# Patient Record
Sex: MALE | Race: BLACK OR AFRICAN AMERICAN | NOT HISPANIC OR LATINO | Employment: UNEMPLOYED | URBAN - METROPOLITAN AREA
[De-identification: names, ages, dates, MRNs, and addresses within clinical notes are randomized per-mention and may not be internally consistent; named-entity substitution may affect disease eponyms.]

---

## 2017-06-01 ENCOUNTER — TRANSFERRED RECORDS (OUTPATIENT)
Dept: HEALTH INFORMATION MANAGEMENT | Facility: CLINIC | Age: 32
End: 2017-06-01

## 2017-06-02 ENCOUNTER — MEDICAL CORRESPONDENCE (OUTPATIENT)
Dept: HEALTH INFORMATION MANAGEMENT | Facility: CLINIC | Age: 32
End: 2017-06-02

## 2017-08-03 ENCOUNTER — OFFICE VISIT (OUTPATIENT)
Dept: OPHTHALMOLOGY | Facility: CLINIC | Age: 32
End: 2017-08-03
Attending: OPHTHALMOLOGY
Payer: MEDICAID

## 2017-08-03 DIAGNOSIS — H40.1190 POAG (PRIMARY OPEN-ANGLE GLAUCOMA): Primary | ICD-10-CM

## 2017-08-03 DIAGNOSIS — H54.7 POOR VISION: ICD-10-CM

## 2017-08-03 DIAGNOSIS — H20.041 SECONDARY IRIDOCYCLITIS, NONINFECTIOUS, RIGHT: ICD-10-CM

## 2017-08-03 PROCEDURE — 99213 OFFICE O/P EST LOW 20 MIN: CPT | Mod: ZF

## 2017-08-03 PROCEDURE — 76512 OPH US DX B-SCAN: CPT | Mod: ZF | Performed by: OPHTHALMOLOGY

## 2017-08-03 RX ORDER — PREDNISOLONE ACETATE 1 %
1 SUSPENSION, DROPS(FINAL DOSAGE FORM)(ML) OPHTHALMIC (EYE) 4 TIMES DAILY
Qty: 15 ML | Refills: 3 | Status: SHIPPED | OUTPATIENT
Start: 2017-08-03 | End: 2017-08-03

## 2017-08-03 RX ORDER — ATROPINE SULFATE 10 MG/ML
1 SOLUTION/ DROPS OPHTHALMIC 2 TIMES DAILY
Qty: 1 BOTTLE | Refills: 11 | Status: SHIPPED | OUTPATIENT
Start: 2017-08-03 | End: 2022-11-01

## 2017-08-03 RX ORDER — TIZANIDINE HYDROCHLORIDE 4 MG/1
CAPSULE, GELATIN COATED ORAL 3 TIMES DAILY
COMMUNITY

## 2017-08-03 RX ORDER — PREDNISOLONE ACETATE 10 MG/ML
1 SUSPENSION/ DROPS OPHTHALMIC 4 TIMES DAILY
Qty: 15 BOTTLE | Refills: 3 | Status: SHIPPED | OUTPATIENT
Start: 2017-08-03 | End: 2022-11-01

## 2017-08-03 ASSESSMENT — EXTERNAL EXAM - LEFT EYE: OS_EXAM: NORMAL

## 2017-08-03 ASSESSMENT — TONOMETRY
IOP_METHOD: TONOPEN
OD_IOP_MMHG: 34
IOP_METHOD: ICARE
OS_IOP_MMHG: 24
OD_IOP_MMHG: 44

## 2017-08-03 ASSESSMENT — VISUAL ACUITY
METHOD: SNELLEN - LINEAR
OS_SC: FIXES AND FOLLOWS

## 2017-08-03 ASSESSMENT — SLIT LAMP EXAM - LIDS
COMMENTS: NORMAL
COMMENTS: NORMAL

## 2017-08-03 ASSESSMENT — EXTERNAL EXAM - RIGHT EYE: OD_EXAM: NORMAL

## 2017-08-03 ASSESSMENT — CONF VISUAL FIELD: COMMENTS: UNABLE

## 2017-08-03 NOTE — PROGRESS NOTES
CC: 32y/o M referred by Dr Praveen Beaver for eval of elevated intraocular pressure     HPI: Eval of acute angle closure glaucoma - right eye. Pt was seen at [facility] on 6/1/17 for eval of redness and cloudiness in right eye. Mom had noticed that he was hitting himself in the right eye and was concerned this was a pain behavior. IOP was elevated to high 50s at that time.    Cataract removal - right side with Dr. Beaver on 6/2/17. Mom reports he has been hitting the right side less frequently. Does continue to notice mild redness in right eye on some days. Not nearly as red as prior to visit to [facility] in 6/2017. Is finished with post-op drops and is not currently using any eyedrops.     Mom has tried safety goggles but pt does not tolerate these.    PAST OCULAR HISTORY:  Hx traumatic cataract s/p removal 6/2/17 (Dr. SOLITARIO Beaver)    MAXIMUM INTRAOCULAR PRESSURE:    64/unsure    MEDICATIONS:  No drops  Keppra  Clonidine  Lorazepam  Vimpat  Baclofen  Glycopyrrolate  Tizanidine  PEG    PMH:  Cerebral palsy  Seizure disorder    FAMILY/SOCIAL HISTORY:        Maternal grandmother: glaucoma, no drops or surgery, has lost significant vision    TESTING TODAY:  Unable to do visual field or OCT  B Scan ultrasound right eye shows possibly hemorrhagic choroidals vs. tractional heme PVD vs. RD. Limited patient cooperation compromises quality.    EXAM:  Mild injection with temporally subluxed IOL, right eye. Difficult exam due to pt cooperation  No red reflex right eye    IMPRESSION:  Acute angle closure glaucoma, right eye:   Hx traumatic hypermature cataract s/p CE/IOL right eye 6/2/17 with Dr. Beaver   Pain behaviors improved since surgery per mom, but pt does continue to hit the right eye   Does not tolerate safety goggles per mom   IOP OD elevated today to 34 iCare/44 tonopen (pt squeezing so probably not that high)   Difficult exam due to pt cooperation. IOL OD appears slightly subluxed  temporally    It is possible that patient originally had phacomorphic glaucoma with eye pain causing him to hit his eye versus some other cause of high intraocular pressure with cataract the result of repeated self inflicted trauma.    Would not be a good trabeculectomy or tube candidate.  If intraocular pressure not controlled after retinal surgery may need laser cyclodestruction or goniosynechialysis     PLAN:  Discussed with Dr Bardales and B Scan right eye reviewed  Patient will see Dr Bardales next week and she will discuss with his mother the risks and benefits of surgery  Begin prednisolone four times a day and atropine twice a day right eye until retina appointment  May need glaucoma medications also,  Intraocular pressure feels about 30 right eye     Attending Physician Attestation:  Complete documentation of historical and exam elements from today's encounter can be found in the full encounter summary report (not reduplicated in this progress note). I personally obtained the chief complaint(s) and history of present illness. I confirmed and edited asnecessary the review of systems, past medical/surgical history, family history, social history, and examination findings as documented by others; and I examined the patient myself. I personally reviewed the relevant tests, images, and reports as documented above. I formulated and edited as necessary the assessment and plan and discussed the findings and management plan with the patient and family.  - Anna Joyner MD 12:16 PM 8/3/2017

## 2017-08-03 NOTE — MR AVS SNAPSHOT
After Visit Summary   8/3/2017    Jerry Ingram    MRN: [id number]           Patient Information     Date Of Birth          1985        Visit Information        Provider Department      8/3/2017 8:30 AM Anna Joyner MD [facility]        Today's Diagnoses     POAG (primary open-angle glaucoma)    -  1    Poor vision        Secondary iridocyclitis, noninfectious, right           Follow-ups after your visit        Follow-up notes from your care team     Return in about 5 years (around 8/3/2022).      Your next 10 appointments already scheduled     Aug 10, 2017  8:30 AM CDT   NEW RETINA with Laney Bardales MD   Eye Clinic ([facility])    [address]  [address]  [address]  [facility] [address] [phone]              Who to contact     Please call your clinic at [phone] to:    Ask questions about your health    Make or cancel appointments    Discuss your medicines    Learn about your test results    Speak to your doctor   If you have compliments or concerns about an experience at your clinic, or if you wish to file a complaint, please contact [facility] Patient Relations at [phone] or email us at [email or web address]         Additional Information About Your Visit        MyChart Information     [employer] is an electronic gateway that provides easy, online access to your medical records. With [employer], you can request a clinic appointment, read your test results, renew a prescription or communicate with your care team.     To sign up for [employer] visit the website at [email or web address]   You will be asked to enter the access code listed below, as well as some personal information. Please follow the directions to create your username and password.     Your access code is: [email or web address]  Expires: 10/18/2017  6:30 AM     Your access code will  in 90 days. If you need help or a new code, please  contact your [facility] or call [phone] for assistance.        Care EveryWhere ID     This is your Care EveryWhere ID. This could be used by other organizations to access your [place] medical records  [id number]         Blood Pressure from Last 3 Encounters:   08/26/11 112/72   03/01/11 121/85    Weight from Last 3 Encounters:   No data found for Wt              We Performed the Following     Pachymetry OU (both eyes)     Ultrasound B-scan OD (right eye)          Today's Medication Changes          These changes are accurate as of: 8/3/17 11:36 AM.  If you have any questions, ask your nurse or doctor.               Start taking these medicines.        Dose/Directions    atropine 1 % ophthalmic solution   Used for:  Secondary iridocyclitis, noninfectious, right   Started by:  Anna Joyner MD        Dose:  1 drop   Place 1 drop into the right eye 2 times daily   Quantity:  1 Bottle   Refills:  11       prednisoLONE acetate 1 % ophthalmic susp   Commonly known as:  PRED FORTE   Used for:  Secondary iridocyclitis, noninfectious, right   Started by:  Anna Joyner MD        Dose:  1 drop   Place 1 drop into the right eye 4 times daily   Quantity:  15 Bottle   Refills:  3         These medicines have changed or have updated prescriptions.        Dose/Directions    tiZANidine 4 MG capsule   Commonly known as:  ZANAFLEX   This may have changed:  Another medication with the same name was removed. Continue taking this medication, and follow the directions you see here.   Changed by:  Anna Joyner MD        Take by mouth 3 times daily   Refills:  0         Stop taking these medicines if you haven't already. Please contact your care team if you have questions.     lamoTRIgine 25 MG tablet   Commonly known as:  LaMICtal   Stopped by:  Anna Joyner MD           ROBINUL 1 MG tablet   Generic drug:  glycopyrrolate   Stopped by:  Anna Joyner MD                Where to  get your medicines      These medications were sent to [id number] - [facility] [address]  [address] [facility] [address]     Phone:  [phone]     atropine 1 % ophthalmic solution    prednisoLONE acetate 1 % ophthalmic susp                Primary Care Provider    No Ref-Primary Verified       No address on file        Equal Access to Services     [facility]: Hadii aad ku hadasho Soomaali, waaxda luqadaha, qaybta kaalmada adeegyada, waxay idiin hayaan adeeg khvirginiash laabram . So [facility] [phone].    ATENCIÓN: Si habla español, tiene a garrido disposición servicios gratuitos de asistencia lingüística. Mason al [phone].    We comply with applicable federal civil rights laws and [place] laws. We do not discriminate on the basis of race, color, national origin, age, disability sex, sexual orientation or gender identity.            Thank you!     Thank you for choosing EYE CLINIC  for your care. Our goal is always to provide you with excellent care. Hearing back from our patients is one way we can continue to improve our services. Please take a few minutes to complete the written survey that you may receive in the mail after your visit with us. Thank you!             Your Updated Medication List - Protect others around you: Learn how to safely use, store and throw away your medicines at www.disposemymeds.org.          This list is accurate as of: 8/3/17 11:36 AM.  Always use your most recent med list.                   Brand Name Dispense Instructions for use Diagnosis    atropine 1 % ophthalmic solution     1 Bottle    Place 1 drop into the right eye 2 times daily    Secondary iridocyclitis, noninfectious, right       BACLOFEN PO      Take by mouth 4 times daily        CLONIDINE HCL PO      Take by mouth 2 times daily        KEPPRA 100 MG/ML solution   Generic drug:  levETIRAcetam      15ML TWICE DAILY        LORazepam 0.5 MG tablet    ATIVAN      Take by mouth as needed        prednisoLONE acetate 1 % ophthalmic susp    PRED FORTE    15 Bottle    Place 1 drop into the right eye 4 times daily    Secondary iridocyclitis, noninfectious, right       predniSONE 20 MG tablet    DELTASONE    3 tablet    Take 1 tablet by mouth daily.        tiZANidine 4 MG capsule    ZANAFLEX     Take by mouth 3 times daily        VIMPAT PO      Take by mouth 2 times daily

## 2017-08-03 NOTE — NURSING NOTE
Chief Complaints and History of Present Illnesses   Patient presents with     Glaucoma Evaluation     HPI    Informant(s):  Mom   Affected eye(s):  Both   Symptoms:     Blurred vision   Redness   Eye discharge         Do you have eye pain now?:  No      Comments:  Had CEIOL RE. Not on any drops. Last eye check was end of June. Per mom he has a history of hitting his RE which caused his glaucoma.     Vero RICARDO August 3, 2017 8:51 AM

## 2017-08-03 NOTE — LETTER
August 3, 2017      Praveen Beaver MD  [facility]  [address] [place] [address]  Fax: [phone]      RE: JERRY INGRAM  : 1985      Dear Dr. Beaver:    I had the pleasure of seeing Jerry Ingram on August 3, 2017 at the [facility].  My exam findings are as follows:      Visual Acuity (Snellen - Linear)      Right Left   Dist sc blinks to light, does not fix or follow fixes and follows            Tonometry (ICare, 8:57 AM)      Right Left   Pressure 34 24         Tonometry #2 (Tonopen, 9:54 AM)      Right Left   Pressure 44          Tonometry Comments     Squeezing          Main Ophthalmology Exam     External Exam      Right Left    External Normal Normal      Slit Lamp Exam      Right Left    Lids/Lashes Normal Normal    Conjunctiva/Sclera 1+ diffuse injection White and quiet    Cornea Clear Clear    Anterior Chamber Deep and quiet Deep and quiet    Iris Round and reactive Round and reactive    Lens IOL subluxed slightly temporally Clear      Fundus Exam      Right Left    Disc no red reflex clear but fleeting view of fundus             History and Present Illness:  32y/o M referred by Dr. Praveen Beaver for eval of elevated intraocular pressure     Eval of acute angle closure glaucoma - right eye. Pt was seen at [facility] on 17 for eval of redness and cloudiness in right eye. Mom had noticed that he was hitting himself in the right eye and was concerned this was a pain behavior. IOP was elevated to high 50s at that time.    Cataract removal - right side with Dr. Beaver on 17. Mom reports he has been hitting the right side less frequently. Does continue to notice mild redness in right eye on some days. Not nearly as red as prior to visit to [facility] in 2017. Is finished with post-op drops and is not currently using any eyedrops.     Mom has tried safety goggles but pt does not tolerate these.    Past Ocular History:   Hx  traumatic cataract s/p removal 6/2/17 (Dr. SOLITARIO Beaver)    Maximum Intraocular Pressure: 64/unsure    Medications:  No drops  Keppra  Clonidine  Lorazepam  Vimpat  Baclofen  Glycopyrrolate  Tizanidine  PEG    Past Medical History:  Cerebral palsy  Seizure disorder    Family/Social History:     Maternal grandmother: glaucoma, no drops or surgery, has lost significant vision      Testing Today:  Unable to do visual field or OCT  B Scan ultrasound right eye shows possibly hemorrhagic choroidals vs. tractional heme PVD vs. RD. Limited patient cooperation compromises quality.    Exam:  Mild injection with temporally subluxed IOL, right eye. Difficult exam due to pt cooperation  No red reflex right eye    Impression:  Acute angle closure glaucoma, right eye:   Hx traumatic hypermature cataract s/p CE/IOL right eye 6/2/17 with Dr. Beaver   Pain behaviors improved since surgery per mom, but pt does continue to hit the right eye   Does not tolerate safety goggles per mom   IOP OD elevated today to 34 iCare/44 tonopen (pt squeezing so probably not that high)   Difficult exam due to pt cooperation. IOL OD appears slightly subluxed temporally    It is possible that patient originally had phacomorphic glaucoma with eye pain causing him to hit his eye versus some other cause of high intraocular pressure with cataract the result of repeated self inflicted trauma.    Would not be a good trabeculectomy or tube candidate.  If intraocular pressure not controlled after retinal surgery may need laser cyclodestruction or goniosynechialysis.     Plan:  Discussed with Dr. Bardales and B Scan right eye reviewed  Patient will see Dr. Bardales next week and she will discuss with his mother the risks and benefits of surgery  Begin prednisolone four times a day and atropine twice a day right eye until retina appointment  May need glaucoma medications also,  Intraocular pressure feels about 30 right eye       Thank you for allowing me to  participate in his care.       Sincerely,       Anna Joyner MD  Glaucoma   [facility]    Enclosed: B scan

## 2017-08-10 ENCOUNTER — TELEPHONE (OUTPATIENT)
Dept: OPHTHALMOLOGY | Facility: CLINIC | Age: 32
End: 2017-08-10

## 2017-08-10 ENCOUNTER — OFFICE VISIT (OUTPATIENT)
Dept: OPHTHALMOLOGY | Facility: CLINIC | Age: 32
End: 2017-08-10
Attending: OPHTHALMOLOGY
Payer: MEDICAID

## 2017-08-10 DIAGNOSIS — H33.21 RETINAL DETACHMENT, RIGHT: Primary | ICD-10-CM

## 2017-08-10 DIAGNOSIS — T85.22XA: ICD-10-CM

## 2017-08-10 PROCEDURE — 76512 OPH US DX B-SCAN: CPT | Mod: ZF | Performed by: OPHTHALMOLOGY

## 2017-08-10 PROCEDURE — 76513 OPH US DX ANT SGM US UNI/BI: CPT | Mod: ZF | Performed by: OPHTHALMOLOGY

## 2017-08-10 PROCEDURE — 99212 OFFICE O/P EST SF 10 MIN: CPT | Mod: ZF

## 2017-08-10 ASSESSMENT — TONOMETRY
OD_IOP_MMHG: 24
IOP_METHOD: ICARE
OS_IOP_MMHG: 14

## 2017-08-10 ASSESSMENT — EXTERNAL EXAM - LEFT EYE: OS_EXAM: NORMAL

## 2017-08-10 ASSESSMENT — VISUAL ACUITY: METHOD: SNELLEN - LINEAR

## 2017-08-10 ASSESSMENT — SLIT LAMP EXAM - LIDS
COMMENTS: NORMAL
COMMENTS: NORMAL

## 2017-08-10 ASSESSMENT — EXTERNAL EXAM - RIGHT EYE: OD_EXAM: NORMAL

## 2017-08-10 NOTE — PROGRESS NOTES
CC: referred by Dr. Joyner for Retinal Detachment evaluation   HPI: Jerry Ingram is a  31 year old year-old patient with history of acute angle closure glaucoma recently evaluated by . Note to have hemorrhagic choroidals vs vitreous hemorrhage vs Retinal Detachment on b-scan and referred for evaluation.    Pt was seen at [facility] on 6/1/17 for eval of redness and cloudiness in right eye. Mom had noticed that he was hitting himself in the right eye and was concerned this was a pain behavior. IOP was elevated to high 50s at that time.  Cataract removal - right side with Dr. Beaver on 6/2/17. Mom reports he has been hitting the right side less frequently. Does continue to notice mild redness in right eye on some days. Not nearly as red as prior to visit to [facility] in 6/2017.  Mom has tried safety goggles but pt does not tolerate these.    PAST OCULAR HISTORY: Hx traumatic cataract s/p removal 6/2/17 (Dr. SOLITARIO Beaver)    Retinal Imaging:  b-scan Right Eye  8/10/17  Hyperechoic density superiorly suggestive of heme vs choroidals. possible Retinal Detachment, there is a hyperechoic band extending post to ant.  UBM showing intraocular lens behind the iris (1.5 -2 mm posterior to the iris); shallow AC    Assessment & Plan:   1- possible retinal detachment Right Eye; possible choroidals; possible IOL dislocation   - history of recent cataract surgery and subsequent lens subluxation  - no view to fundus   Due to VH and corneal edema  - DD includes vitreous hemorrhage vs Retinal Detachment   - need to consider body harness for after surgery to protect from self trauma, mom will discuss with PCP    2. Hx traumatic hypermature cataract s/p CE/IOL right eye 6/2/17 with Dr. Beaver  Pain behaviors improved since surgery per mom, but pt does continue to hit the right eye  Does not tolerate safety goggles per mom  IOP right eye 24 bu persistent K edema  Difficult exam due to pt cooperation. IOL OD  appears slightly subluxed temporally  It is possible that patient originally had phacomorphic glaucoma with eye pain causing him to hit his eye versus some other cause of high intraocular pressure with cataract the result of repeated self inflicted trauma.  Would not be a good trabeculectomy or tube candidate.  If intraocular pressure not controlled after retinal surgery may need laser cyclodestruction or goniosynechiolysis.     3. Patient with history of MR; CP; microcephaly  Patient  On a wheel chair; does not walk  on tube feedings.   Poor cooperation    Plan for surgery and exam under anesthesia both eyes   right eye:  23 g Pars plana vitrectomy (PPV) ; possible Retinal Detachment repair; possible gas vs SO; possible choroidal drainage; possible IOL repositioning; removal; exchange right eye  General anesthesia; 2-3 hours surgery   might need combo surgery for intraocular lens repositioning/ suturing or exchange  Possible  cyclodestruction or goniosynechiolysis.       Hernesto Dumont MD, PhD  Vitreoretinal Surgery Fellow    ~~~~~~~~~~~~~~~~~~~~~~~~~~~~~~~~~~   Complete documentation of historical and exam elements from today's encounter can be found in the full encounter summary report (not reduplicated in this progress note).  I personally obtained the chief complaint(s) and history of present illness.  I confirmed and edited as necessary the review of systems, past medical/surgical history, family history, social history, and examination findings as documented by others; and I examined the patient myself.  I personally reviewed the relevant tests, images, and reports as documented above.  I formulated and edited as necessary the assessment and plan and discussed the findings and management plan with the patient and family    Laney Bardales MD  .  Retina Service   Department of Ophthalmology and Visual Neurosciences   [facility]  Phone: [phone]   Fax: [phone]

## 2017-08-10 NOTE — Clinical Note
Evert Torres, Patient initially referred to Dr. Joyner by Dr. Beaver. This patient needs to be schedule for vitrectomy; likely combo surgery with cornea because of  Possible dislocation of the intraocular lens ( Dr. Joyner and I thought slightly dislocated intraocular lens). Patient left today before he follow up with cornea. Complex case.  Patient with history of MR; CP; microcephaly; Patient  On a wheel chair; does not walk on tube feedings. poor cooperation for clinical examinatinon.  Plan for ight eye:  23 g Pars plana vitrectomy (PPV) ; possible Retinal Detachment repair; possible gas vs SO; possible choroidal drainage; possible IOL repositioning; removal; exchange right eye General anesthesia; 2-3 hours surgery. Would favor Combo case with cornea. Anna; do you think he needs laser cyclodestruction or goniosynechiolysis? At the time of surgery? Today intraocular pressure 24 pls see my clinical note Thanks siena

## 2017-08-10 NOTE — LETTER
8/10/2017       RE: Jerry Ingram  [address]  [facility] [address]     Dear Colleague,    Thank you for referring your patient, Jerry Ingram, to the EYE CLINIC at [facility]. Please see a copy of my visit note below.      CC: referred by Dr. Joyner for Retinal Detachment evaluation   HPI: Jerry Ingram is a  31 year old year-old patient with history of acute angle closure glaucoma recently evaluated by . Note to have hemorrhagic choroidals vs vitreous hemorrhage vs Retinal Detachment on b-scan and referred for evaluation.    Pt was seen at [facility] on 6/1/17 for eval of redness and cloudiness in right eye. Mom had noticed that he was hitting himself in the right eye and was concerned this was a pain behavior. IOP was elevated to high 50s at that time.  Cataract removal - right side with Dr. Beaver on 6/2/17. Mom reports he has been hitting the right side less frequently. Does continue to notice mild redness in right eye on some days. Not nearly as red as prior to visit to [facility] in 6/2017.  Mom has tried safety goggles but pt does not tolerate these.    PAST OCULAR HISTORY: Hx traumatic cataract s/p removal 6/2/17 (Dr. SOLITARIO Beaver)    Retinal Imaging:  b-scan Right Eye  8/10/17  Hyperechoic density superiorly suggestive of heme vs choroidals. possible Retinal Detachment, there is a hyperechoic band extending post to ant.  UBM showing intraocular lens behind the iris (1.5 -2 mm posterior to the iris); shallow AC    Assessment & Plan:   1- possible retinal detachment Right Eye; possible choroidals; possible IOL dislocation   - history of recent cataract surgery and subsequent lens subluxation  - no view to fundus   Due to VH and corneal edema  - DD includes vitreous hemorrhage vs Retinal Detachment   - need to consider body harness for after surgery to protect from self trauma, mom will discuss with PCP    2. Hx traumatic hypermature cataract  s/p CE/IOL right eye 6/2/17 with Dr. Beaver  Pain behaviors improved since surgery per mom, but pt does continue to hit the right eye  Does not tolerate safety goggles per mom  IOP right eye 24 bu persistent K edema  Difficult exam due to pt cooperation. IOL OD appears slightly subluxed temporally  It is possible that patient originally had phacomorphic glaucoma with eye pain causing him to hit his eye versus some other cause of high intraocular pressure with cataract the result of repeated self inflicted trauma.  Would not be a good trabeculectomy or tube candidate.  If intraocular pressure not controlled after retinal surgery may need laser cyclodestruction or goniosynechiolysis.     3. Patient with history of MR; CP; microcephaly  Patient  On a wheel chair; does not walk  on tube feedings.   Poor cooperation    Plan for surgery and exam under anesthesia both eyes   right eye:  23 g Pars plana vitrectomy (PPV) ; possible Retinal Detachment repair; possible gas vs SO; possible choroidal drainage; possible IOL repositioning; removal; exchange right eye  General anesthesia; 2-3 hours surgery   might need combo surgery for intraocular lens repositioning/ suturing or exchange  Possible  cyclodestruction or goniosynechiolysis.       Again, thank you for allowing me to participate in the care of your patient.      Sincerely,      Laney Bardales MD  .  Retina Service   Department of Ophthalmology and Visual Neurosciences   [facility]  Phone: [phone]   Fax: [phone]

## 2017-08-10 NOTE — NURSING NOTE
Chief Complaints and History of Present Illnesses   Patient presents with     Consult For     Retina evaluation      HPI    Affected eye(s):  Both   Symptoms:        Duration:  1 week   Frequency:  Constant       Do you have eye pain now?:  No      Comments:  Pt.'s mother states that they are here to discuss possible retina surgery.  No changes BE per pt.'s mother.  Marie Stratton COT 8:37 AM August 10, 2017

## 2017-08-10 NOTE — MR AVS SNAPSHOT
After Visit Summary   8/10/2017    Jerry Ingram    MRN: [id number]           Patient Information     Date Of Birth          1985        Visit Information        Provider Department      8/10/2017 8:30 AM Laney Bardales [facility]        Today's Diagnoses     Retinal detachment, right - Right Eye    -  1    Dislocation of intraocular lens into vitreous of right eye           Follow-ups after your visit        Follow-up notes from your care team     Return in about 1 week (around 2017).      Who to contact     Please call your clinic at [phone] to:    Ask questions about your health    Make or cancel appointments    Discuss your medicines    Learn about your test results    Speak to your doctor   If you have compliments or concerns about an experience at your clinic, or if you wish to file a complaint, please contact [facility] Patient Relations at [phone] or email us at [email or web address]         Additional Information About Your Visit        MyChart Information     etriggt is an electronic gateway that provides easy, online access to your medical records. With [email or web address], you can request a clinic appointment, read your test results, renew a prescription or communicate with your care team.     To sign up for etriggt visit the website at [email or web address]   You will be asked to enter the access code listed below, as well as some personal information. Please follow the directions to create your username and password.     Your access code is: [email or web address]  Expires: 10/18/2017  6:30 AM     Your access code will  in 90 days. If you need help or a new code, please contact your [facility] or call [phone] for assistance.        Care EveryWhere ID     This is your Care EveryWhere ID. This could be used by other organizations to access your [place] medical records  [id number]         Blood  Pressure from Last 3 Encounters:   08/26/11 112/72   03/01/11 121/85    Weight from Last 3 Encounters:   No data found for Wt              We Performed the Following     Huong-Operative Worksheet (Retina)     UBM-Anterior Segment US OD (right eye)     Ultrasound B-scan OD (right eye)        Primary Care Provider    No Ref-Primary Verified       No address on file        Equal Access to Services     [place] GLASERJEREMY : Hadii aad ku hadasho Soomaali, waaxda luqadaha, qaybta kaalmada adeegyada, waxbabs mossin hayamyn adeumu guzmán sd . So [facility] [phone].    ATENCIÓN: Si habla español, tiene a garrido disposición servicios gratuitos de asistencia lingüística. Llame al [phone].    We comply with applicable federal civil rights laws and [place] laws. We do not discriminate on the basis of race, color, national origin, age, disability sex, sexual orientation or gender identity.            Thank you!     Thank you for choosing [facility]  for your care. Our goal is always to provide you with excellent care. Hearing back from our patients is one way we can continue to improve our services. Please take a few minutes to complete the written survey that you may receive in the mail after your visit with us. Thank you!             Your Updated Medication List - Protect others around you: Learn how to safely use, store and throw away your medicines at www.disposemymeds.org.          This list is accurate as of: 8/10/17 11:59 PM.  Always use your most recent med list.                   Brand Name Dispense Instructions for use Diagnosis    atropine 1 % ophthalmic solution     1 Bottle    Place 1 drop into the right eye 2 times daily    Secondary iridocyclitis, noninfectious, right       BACLOFEN PO      Take by mouth 4 times daily        CLONIDINE HCL PO      Take by mouth 2 times daily        KEPPRA 100 MG/ML solution   Generic drug:  levETIRAcetam      15ML TWICE DAILY        LORazepam 0.5 MG tablet    ATIVAN     Take by mouth as  needed        prednisoLONE acetate 1 % ophthalmic susp    PRED FORTE    15 Bottle    Place 1 drop into the right eye 4 times daily    Secondary iridocyclitis, noninfectious, right       predniSONE 20 MG tablet    DELTASONE    3 tablet    Take 1 tablet by mouth daily.        tiZANidine 4 MG capsule    ZANAFLEX     Take by mouth 3 times daily        VIMPAT PO      Take by mouth 2 times daily

## 2017-08-11 ENCOUNTER — TELEPHONE (OUTPATIENT)
Dept: OPHTHALMOLOGY | Facility: CLINIC | Age: 32
End: 2017-08-11

## 2017-08-15 ENCOUNTER — SURGERY (OUTPATIENT)
Age: 32
End: 2017-08-15

## 2017-08-15 ENCOUNTER — ANESTHESIA (OUTPATIENT)
Dept: SURGERY | Facility: CLINIC | Age: 32
End: 2017-08-15
Payer: MEDICAID

## 2017-08-15 ENCOUNTER — HOSPITAL ENCOUNTER (OUTPATIENT)
Facility: CLINIC | Age: 32
Discharge: HOME | End: 2017-08-15
Attending: OPHTHALMOLOGY | Admitting: OPHTHALMOLOGY
Payer: MEDICAID

## 2017-08-15 ENCOUNTER — ANESTHESIA EVENT (OUTPATIENT)
Dept: SURGERY | Facility: CLINIC | Age: 32
End: 2017-08-15
Payer: MEDICAID

## 2017-08-15 VITALS
SYSTOLIC BLOOD PRESSURE: 101 MMHG | TEMPERATURE: 97 F | DIASTOLIC BLOOD PRESSURE: 72 MMHG | HEIGHT: 60 IN | BODY MASS INDEX: 19.63 KG/M2 | WEIGHT: 100 LBS | OXYGEN SATURATION: 100 % | RESPIRATION RATE: 10 BRPM

## 2017-08-15 DIAGNOSIS — Z98.890 S/P EYE SURGERY: ICD-10-CM

## 2017-08-15 DIAGNOSIS — H33.21 RETINAL DETACHMENT, RIGHT: Primary | ICD-10-CM

## 2017-08-15 PROCEDURE — S0020 INJECTION, BUPIVICAINE HYDRO: HCPCS | Performed by: OPHTHALMOLOGY

## 2017-08-15 PROCEDURE — 27210995 ZZH RX 272: Performed by: OPHTHALMOLOGY

## 2017-08-15 PROCEDURE — 25000128 H RX IP 250 OP 636: Performed by: OPHTHALMOLOGY

## 2017-08-15 PROCEDURE — 25000128 H RX IP 250 OP 636: Performed by: ANESTHESIOLOGY

## 2017-08-15 PROCEDURE — 25000125 ZZHC RX 250: Performed by: NURSE ANESTHETIST, CERTIFIED REGISTERED

## 2017-08-15 PROCEDURE — 37000009 ZZH ANESTHESIA TECHNICAL FEE, EACH ADDTL 15 MIN: Performed by: OPHTHALMOLOGY

## 2017-08-15 PROCEDURE — 27210794 ZZH OR GENERAL SUPPLY STERILE: Performed by: OPHTHALMOLOGY

## 2017-08-15 PROCEDURE — 40000170 ZZH STATISTIC PRE-PROCEDURE ASSESSMENT II: Performed by: OPHTHALMOLOGY

## 2017-08-15 PROCEDURE — 71000006 ZZH RECOVERY EYE PHASE 1 LEVEL 2 FIRST HR: Performed by: OPHTHALMOLOGY

## 2017-08-15 PROCEDURE — 25000566 ZZH SEVOFLURANE, EA 15 MIN: Performed by: OPHTHALMOLOGY

## 2017-08-15 PROCEDURE — 25000125 ZZHC RX 250: Performed by: OPHTHALMOLOGY

## 2017-08-15 PROCEDURE — 25000128 H RX IP 250 OP 636: Performed by: NURSE ANESTHETIST, CERTIFIED REGISTERED

## 2017-08-15 PROCEDURE — 71000028 ZZH EYE RECOVERY PHASE 2 EACH 15 MINS: Performed by: OPHTHALMOLOGY

## 2017-08-15 PROCEDURE — 71000007: Performed by: OPHTHALMOLOGY

## 2017-08-15 PROCEDURE — 36000104 ZZH EYE SURGERY LEVEL 4 1ST 30 MIN: Performed by: OPHTHALMOLOGY

## 2017-08-15 PROCEDURE — 36000105 ZZH EYE SURGERY LEVEL 4 EA 15 ADDTL MIN: Performed by: OPHTHALMOLOGY

## 2017-08-15 PROCEDURE — 37000008 ZZH ANESTHESIA TECHNICAL FEE, 1ST 30 MIN: Performed by: OPHTHALMOLOGY

## 2017-08-15 RX ORDER — ALBUTEROL SULFATE 0.83 MG/ML
2.5 SOLUTION RESPIRATORY (INHALATION) EVERY 4 HOURS PRN
Status: DISCONTINUED | OUTPATIENT
Start: 2017-08-15 | End: 2017-08-15 | Stop reason: HOSPADM

## 2017-08-15 RX ORDER — NEOMYCIN SULFATE, POLYMYXIN B SULFATE, AND DEXAMETHASONE 3.5; 10000; 1 MG/G; [USP'U]/G; MG/G
OINTMENT OPHTHALMIC PRN
Status: DISCONTINUED | OUTPATIENT
Start: 2017-08-15 | End: 2017-08-15 | Stop reason: HOSPADM

## 2017-08-15 RX ORDER — SODIUM CHLORIDE, SODIUM LACTATE, POTASSIUM CHLORIDE, CALCIUM CHLORIDE 600; 310; 30; 20 MG/100ML; MG/100ML; MG/100ML; MG/100ML
INJECTION, SOLUTION INTRAVENOUS CONTINUOUS
Status: DISCONTINUED | OUTPATIENT
Start: 2017-08-15 | End: 2017-08-15 | Stop reason: HOSPADM

## 2017-08-15 RX ORDER — PROPOFOL 10 MG/ML
INJECTION, EMULSION INTRAVENOUS PRN
Status: DISCONTINUED | OUTPATIENT
Start: 2017-08-15 | End: 2017-08-15

## 2017-08-15 RX ORDER — NALOXONE HYDROCHLORIDE 0.4 MG/ML
.1-.4 INJECTION, SOLUTION INTRAMUSCULAR; INTRAVENOUS; SUBCUTANEOUS
Status: DISCONTINUED | OUTPATIENT
Start: 2017-08-15 | End: 2017-08-15 | Stop reason: HOSPADM

## 2017-08-15 RX ORDER — TROPICAMIDE 10 MG/ML
1 SOLUTION/ DROPS OPHTHALMIC
Status: COMPLETED | OUTPATIENT
Start: 2017-08-15 | End: 2017-08-15

## 2017-08-15 RX ORDER — FENTANYL CITRATE 50 UG/ML
INJECTION, SOLUTION INTRAMUSCULAR; INTRAVENOUS PRN
Status: DISCONTINUED | OUTPATIENT
Start: 2017-08-15 | End: 2017-08-15

## 2017-08-15 RX ORDER — ONDANSETRON 2 MG/ML
INJECTION INTRAMUSCULAR; INTRAVENOUS PRN
Status: DISCONTINUED | OUTPATIENT
Start: 2017-08-15 | End: 2017-08-15

## 2017-08-15 RX ORDER — LABETALOL HYDROCHLORIDE 5 MG/ML
10 INJECTION, SOLUTION INTRAVENOUS
Status: DISCONTINUED | OUTPATIENT
Start: 2017-08-15 | End: 2017-08-15 | Stop reason: HOSPADM

## 2017-08-15 RX ORDER — DEXAMETHASONE SODIUM PHOSPHATE 4 MG/ML
INJECTION, SOLUTION INTRA-ARTICULAR; INTRALESIONAL; INTRAMUSCULAR; INTRAVENOUS; SOFT TISSUE PRN
Status: DISCONTINUED | OUTPATIENT
Start: 2017-08-15 | End: 2017-08-15 | Stop reason: HOSPADM

## 2017-08-15 RX ORDER — MEPERIDINE HYDROCHLORIDE 25 MG/ML
12.5 INJECTION INTRAMUSCULAR; INTRAVENOUS; SUBCUTANEOUS
Status: DISCONTINUED | OUTPATIENT
Start: 2017-08-15 | End: 2017-08-15 | Stop reason: HOSPADM

## 2017-08-15 RX ORDER — ATROPINE SULFATE 10 MG/ML
SOLUTION/ DROPS OPHTHALMIC PRN
Status: DISCONTINUED | OUTPATIENT
Start: 2017-08-15 | End: 2017-08-15 | Stop reason: HOSPADM

## 2017-08-15 RX ORDER — PHENYLEPHRINE HYDROCHLORIDE 100 MG/ML
SOLUTION/ DROPS OPHTHALMIC PRN
Status: DISCONTINUED | OUTPATIENT
Start: 2017-08-15 | End: 2017-08-15 | Stop reason: HOSPADM

## 2017-08-15 RX ORDER — FENTANYL CITRATE 50 UG/ML
25-50 INJECTION, SOLUTION INTRAMUSCULAR; INTRAVENOUS
Status: DISCONTINUED | OUTPATIENT
Start: 2017-08-15 | End: 2017-08-15 | Stop reason: HOSPADM

## 2017-08-15 RX ORDER — ONDANSETRON 4 MG/1
4 TABLET, ORALLY DISINTEGRATING ORAL EVERY 30 MIN PRN
Status: DISCONTINUED | OUTPATIENT
Start: 2017-08-15 | End: 2017-08-15 | Stop reason: HOSPADM

## 2017-08-15 RX ORDER — PREDNISOLONE ACETATE 10 MG/ML
1 SUSPENSION/ DROPS OPHTHALMIC 4 TIMES DAILY
Qty: 5 ML | Refills: 0 | Status: SHIPPED | OUTPATIENT
Start: 2017-08-15 | End: 2017-10-12

## 2017-08-15 RX ORDER — DIAZEPAM ORAL SOLUTION (CONCENTRATE) 5 MG/ML
10 SOLUTION ORAL ONCE
Status: DISCONTINUED | OUTPATIENT
Start: 2017-08-15 | End: 2017-08-15 | Stop reason: HOSPADM

## 2017-08-15 RX ORDER — PROPOFOL 10 MG/ML
INJECTION, EMULSION INTRAVENOUS CONTINUOUS PRN
Status: DISCONTINUED | OUTPATIENT
Start: 2017-08-15 | End: 2017-08-15

## 2017-08-15 RX ORDER — ONDANSETRON 2 MG/ML
4 INJECTION INTRAMUSCULAR; INTRAVENOUS EVERY 30 MIN PRN
Status: DISCONTINUED | OUTPATIENT
Start: 2017-08-15 | End: 2017-08-15 | Stop reason: HOSPADM

## 2017-08-15 RX ORDER — OFLOXACIN 3 MG/ML
1 SOLUTION/ DROPS OPHTHALMIC 4 TIMES DAILY
Qty: 5 ML | Refills: 0 | Status: SHIPPED | OUTPATIENT
Start: 2017-08-15 | End: 2022-11-01

## 2017-08-15 RX ORDER — PHENYLEPHRINE HYDROCHLORIDE 25 MG/ML
1 SOLUTION/ DROPS OPHTHALMIC
Status: COMPLETED | OUTPATIENT
Start: 2017-08-15 | End: 2017-08-15

## 2017-08-15 RX ORDER — CYCLOPENTOLATE HYDROCHLORIDE 10 MG/ML
1 SOLUTION/ DROPS OPHTHALMIC
Status: COMPLETED | OUTPATIENT
Start: 2017-08-15 | End: 2017-08-15

## 2017-08-15 RX ORDER — GLYCOPYRROLATE 0.2 MG/ML
INJECTION, SOLUTION INTRAMUSCULAR; INTRAVENOUS PRN
Status: DISCONTINUED | OUTPATIENT
Start: 2017-08-15 | End: 2017-08-15

## 2017-08-15 RX ORDER — LIDOCAINE HYDROCHLORIDE 20 MG/ML
INJECTION, SOLUTION INFILTRATION; PERINEURAL PRN
Status: DISCONTINUED | OUTPATIENT
Start: 2017-08-15 | End: 2017-08-15

## 2017-08-15 RX ORDER — NEOSTIGMINE METHYLSULFATE 1 MG/ML
VIAL (ML) INJECTION PRN
Status: DISCONTINUED | OUTPATIENT
Start: 2017-08-15 | End: 2017-08-15

## 2017-08-15 RX ORDER — BALANCED SALT SOLUTION 6.4; .75; .48; .3; 3.9; 1.7 MG/ML; MG/ML; MG/ML; MG/ML; MG/ML; MG/ML
SOLUTION OPHTHALMIC PRN
Status: DISCONTINUED | OUTPATIENT
Start: 2017-08-15 | End: 2017-08-15 | Stop reason: HOSPADM

## 2017-08-15 RX ORDER — DEXAMETHASONE SODIUM PHOSPHATE 4 MG/ML
INJECTION, SOLUTION INTRA-ARTICULAR; INTRALESIONAL; INTRAMUSCULAR; INTRAVENOUS; SOFT TISSUE PRN
Status: DISCONTINUED | OUTPATIENT
Start: 2017-08-15 | End: 2017-08-15

## 2017-08-15 RX ORDER — HYDRALAZINE HYDROCHLORIDE 20 MG/ML
2.5-5 INJECTION INTRAMUSCULAR; INTRAVENOUS EVERY 10 MIN PRN
Status: DISCONTINUED | OUTPATIENT
Start: 2017-08-15 | End: 2017-08-15 | Stop reason: HOSPADM

## 2017-08-15 RX ORDER — LACOSAMIDE 10 MG/ML
200 SOLUTION ORAL 2 TIMES DAILY
COMMUNITY

## 2017-08-15 RX ADMIN — NEOMYCIN SULFATE, POLYMYXIN B SULFATE, AND DEXAMETHASONE 1 G: 3.5; 10000; 1 OINTMENT OPHTHALMIC at 11:05

## 2017-08-15 RX ADMIN — SODIUM CHLORIDE, POTASSIUM CHLORIDE, SODIUM LACTATE AND CALCIUM CHLORIDE: 600; 310; 30; 20 INJECTION, SOLUTION INTRAVENOUS at 07:42

## 2017-08-15 RX ADMIN — TROPICAMIDE 1 DROP: 10 SOLUTION/ DROPS OPHTHALMIC at 06:26

## 2017-08-15 RX ADMIN — EPINEPHRINE 500 ML: 1 INJECTION, SOLUTION, CONCENTRATE INTRAVENOUS at 09:58

## 2017-08-15 RX ADMIN — PHENYLEPHRINE HYDROCHLORIDE 100 MCG: 10 INJECTION, SOLUTION INTRAMUSCULAR; INTRAVENOUS; SUBCUTANEOUS at 09:02

## 2017-08-15 RX ADMIN — DEXAMETHASONE SODIUM PHOSPHATE 2 MG: 4 INJECTION, SOLUTION INTRA-ARTICULAR; INTRALESIONAL; INTRAMUSCULAR; INTRAVENOUS; SOFT TISSUE at 10:46

## 2017-08-15 RX ADMIN — ATROPINE SULFATE 1 DROP: 10 SOLUTION OPHTHALMIC at 11:04

## 2017-08-15 RX ADMIN — FENTANYL CITRATE 50 MCG: 50 INJECTION, SOLUTION INTRAMUSCULAR; INTRAVENOUS at 12:16

## 2017-08-15 RX ADMIN — SODIUM CHLORIDE, POTASSIUM CHLORIDE, SODIUM LACTATE AND CALCIUM CHLORIDE: 600; 310; 30; 20 INJECTION, SOLUTION INTRAVENOUS at 09:57

## 2017-08-15 RX ADMIN — PHENYLEPHRINE HYDROCHLORIDE 50 MCG: 10 INJECTION, SOLUTION INTRAMUSCULAR; INTRAVENOUS; SUBCUTANEOUS at 08:54

## 2017-08-15 RX ADMIN — Medication 5.5 MG: at 08:21

## 2017-08-15 RX ADMIN — PHENYLEPHRINE HYDROCHLORIDE 1 DROP: 2.5 SOLUTION/ DROPS OPHTHALMIC at 06:47

## 2017-08-15 RX ADMIN — Medication 5.5 MG: at 10:01

## 2017-08-15 RX ADMIN — PROPOFOL 120 MG: 10 INJECTION, EMULSION INTRAVENOUS at 07:42

## 2017-08-15 RX ADMIN — DEXMEDETOMIDINE HYDROCHLORIDE 4 MCG: 100 INJECTION, SOLUTION INTRAVENOUS at 10:30

## 2017-08-15 RX ADMIN — GLYCOPYRROLATE 0.5 MG: 0.2 INJECTION, SOLUTION INTRAMUSCULAR; INTRAVENOUS at 10:45

## 2017-08-15 RX ADMIN — PROPOFOL 75 MCG/KG/MIN: 10 INJECTION, EMULSION INTRAVENOUS at 07:42

## 2017-08-15 RX ADMIN — LIDOCAINE HYDROCHLORIDE,EPINEPHRINE BITARTRATE 5 ML GIVEN: 20; .005 INJECTION, SOLUTION EPIDURAL; INFILTRATION; INTRACAUDAL; PERINEURAL at 11:04

## 2017-08-15 RX ADMIN — PHENYLEPHRINE HYDROCHLORIDE 100 MCG: 10 INJECTION, SOLUTION INTRAMUSCULAR; INTRAVENOUS; SUBCUTANEOUS at 08:00

## 2017-08-15 RX ADMIN — PHENYLEPHRINE HYDROCHLORIDE 2 DROP: 100 SOLUTION/ DROPS OPHTHALMIC at 08:20

## 2017-08-15 RX ADMIN — DEXMEDETOMIDINE HYDROCHLORIDE 8 MCG: 100 INJECTION, SOLUTION INTRAVENOUS at 10:26

## 2017-08-15 RX ADMIN — CYCLOPENTOLATE HYDROCHLORIDE 1 DROP: 10 SOLUTION/ DROPS OPHTHALMIC at 06:26

## 2017-08-15 RX ADMIN — CYCLOPENTOLATE HYDROCHLORIDE 1 DROP: 10 SOLUTION/ DROPS OPHTHALMIC at 06:47

## 2017-08-15 RX ADMIN — FENTANYL CITRATE 25 MCG: 50 INJECTION, SOLUTION INTRAMUSCULAR; INTRAVENOUS at 08:24

## 2017-08-15 RX ADMIN — PHENYLEPHRINE HYDROCHLORIDE 1 DROP: 2.5 SOLUTION/ DROPS OPHTHALMIC at 06:39

## 2017-08-15 RX ADMIN — TROPICAMIDE 1 DROP: 10 SOLUTION/ DROPS OPHTHALMIC at 06:39

## 2017-08-15 RX ADMIN — NEOSTIGMINE METHYLSULFATE 2 MG: 1 INJECTION INTRAMUSCULAR; INTRAVENOUS; SUBCUTANEOUS at 10:45

## 2017-08-15 RX ADMIN — LIDOCAINE HYDROCHLORIDE 60 MG: 20 INJECTION, SOLUTION INFILTRATION; PERINEURAL at 07:38

## 2017-08-15 RX ADMIN — PHENYLEPHRINE HYDROCHLORIDE 100 MCG: 10 INJECTION, SOLUTION INTRAMUSCULAR; INTRAVENOUS; SUBCUTANEOUS at 09:31

## 2017-08-15 RX ADMIN — DEXMEDETOMIDINE HYDROCHLORIDE 4 MCG: 100 INJECTION, SOLUTION INTRAVENOUS at 10:41

## 2017-08-15 RX ADMIN — PHENYLEPHRINE HYDROCHLORIDE 100 MCG: 10 INJECTION, SOLUTION INTRAMUSCULAR; INTRAVENOUS; SUBCUTANEOUS at 08:30

## 2017-08-15 RX ADMIN — PHENYLEPHRINE HYDROCHLORIDE 100 MCG: 10 INJECTION, SOLUTION INTRAMUSCULAR; INTRAVENOUS; SUBCUTANEOUS at 08:23

## 2017-08-15 RX ADMIN — FENTANYL CITRATE 50 MCG: 50 INJECTION, SOLUTION INTRAMUSCULAR; INTRAVENOUS at 07:56

## 2017-08-15 RX ADMIN — FENTANYL CITRATE 50 MCG: 50 INJECTION, SOLUTION INTRAMUSCULAR; INTRAVENOUS at 12:01

## 2017-08-15 RX ADMIN — ONDANSETRON 4 MG: 2 INJECTION INTRAMUSCULAR; INTRAVENOUS at 10:32

## 2017-08-15 RX ADMIN — PHENYLEPHRINE HYDROCHLORIDE 100 MCG: 10 INJECTION, SOLUTION INTRAMUSCULAR; INTRAVENOUS; SUBCUTANEOUS at 08:41

## 2017-08-15 RX ADMIN — ROCURONIUM BROMIDE 40 MG: 10 INJECTION INTRAVENOUS at 07:48

## 2017-08-15 RX ADMIN — EPINEPHRINE 500 ML: 1 INJECTION, SOLUTION, CONCENTRATE INTRAVENOUS at 08:20

## 2017-08-15 RX ADMIN — PHENYLEPHRINE HYDROCHLORIDE 1 DROP: 2.5 SOLUTION/ DROPS OPHTHALMIC at 06:26

## 2017-08-15 RX ADMIN — CYCLOPENTOLATE HYDROCHLORIDE 1 DROP: 10 SOLUTION/ DROPS OPHTHALMIC at 06:39

## 2017-08-15 RX ADMIN — WATER 0.5 ML: 1 INJECTION INTRAMUSCULAR; INTRAVENOUS; SUBCUTANEOUS at 10:46

## 2017-08-15 RX ADMIN — Medication 5.5 MG: at 09:21

## 2017-08-15 RX ADMIN — FENTANYL CITRATE 25 MCG: 50 INJECTION, SOLUTION INTRAMUSCULAR; INTRAVENOUS at 08:41

## 2017-08-15 RX ADMIN — BALANCED SALT SOLUTION 15 ML: 6.4; .75; .48; .3; 3.9; 1.7 SOLUTION OPHTHALMIC at 10:46

## 2017-08-15 RX ADMIN — TROPICAMIDE 1 DROP: 10 SOLUTION/ DROPS OPHTHALMIC at 06:47

## 2017-08-15 RX ADMIN — DEXAMETHASONE SODIUM PHOSPHATE 4 MG: 4 INJECTION, SOLUTION INTRA-ARTICULAR; INTRALESIONAL; INTRAMUSCULAR; INTRAVENOUS; SOFT TISSUE at 08:26

## 2017-08-15 RX ADMIN — Medication 5.5 MG: at 10:09

## 2017-08-15 ASSESSMENT — ENCOUNTER SYMPTOMS: SEIZURES: 1

## 2017-08-15 NOTE — IP AVS SNAPSHOT
[facility]    [address]    [place] [address]    Phone:  [phone]                                       After Visit Summary   8/15/2017    Jerry Ingram    MRN: [id number]           After Visit Summary Signature Page     I have received my discharge instructions, and my questions have been answered. I have discussed any challenges I see with this plan with the nurse or doctor.    ..........................................................................................................................................  Patient/Patient Representative Signature      ..........................................................................................................................................  Patient Representative Print Name and Relationship to Patient    ..................................................               ................................................  Date                                            Time    ..........................................................................................................................................  Reviewed by Signature/Title    ...................................................              ..............................................  Date                                                            Time

## 2017-08-15 NOTE — ANESTHESIA CARE TRANSFER NOTE
Patient: Jerry Ingram    Procedure(s):  RIGHT EYE VITRECTOMY PARSPLANA WITH 23 GAUGE SYSTEM, EXAM UNDER ANESTHESIA, AIR/FLUID EXCHANGE, REMOVAL OF INTRAOCULAR LENS AND CAPSULAR TENSION RING    LEFT EYE EXAM UNDER ANESTHESIA - Wound Class: I-Clean   - Wound Class: I-Clean    Diagnosis: RIGHT EYE RETINAL DETACHMENT  Diagnosis Additional Information: No value filed.    Anesthesia Type:   General, LMA     Note:  Airway :Face Mask and Oral Airway  Patient transferred to:PACU  Comments: Spontaneous respirations, tidal volume > 500, oxygen saturation > 97%, TOF 4/4 with > 5 seconds sustained tetany.  Suctioned and extubated with cuff down to facemask.  Exchanging well.Transferred to PACU, spontaneous respirations, 10L oxygen via facemask.  All monitors and alarms on and functioning, VSS.  Report to PACU RN.      Vitals: (Last set prior to Anesthesia Care Transfer)    CRNA VITALS  8/15/2017 1051 - 8/15/2017 1130      8/15/2017             Pulse: 69    SpO2: 98 %    Resp Rate (set): 10                Electronically Signed By: MARINA Funk CRNA  August 15, 2017  11:30 AM

## 2017-08-15 NOTE — DISCHARGE INSTRUCTIONS
[facility]esthesia [facility] Discharge  Instructions  Anesthesia ([facility])   Adult Discharge Instructions (General)    For 24 hours after surgery    1. Get plenty of rest.  Make arrangements to have a responsible adult stay with you for at least 24 hours.  2. Do not drive or use heavy equipment for 24 hours.    3. Do not drink alcohol for 24 hours.  4. Do not sign legal documents or make important decisions for 24 hours.  5. Avoid strenuous or risky activities. You may feel lightheaded.  If so, sit for a few minutes before standing.  Have someone help you get up.   6. General anesthesia patients should drink only clear liquids (apple juice, ginger ale, broth or 7-Up). Be sure to drink enough fluids.  Move to a regular diet as you feel able.  7. Any questions of medical nature, call your physician.  [facility]  Discharge Instruction    EyeSurgery [facility]    MD Laney Humphries MD Joseph Terry, MD  Will I have pain?  Some discomfort is normal and expected following surgery.   Taking Tylenol (acetaminophen) regularly may help prevent pain.  Discomfort should gradually decrease and Tylenol should be sufficient to relieve pain. A foreign body sensation in the cornea of the eye is very common and caused by sutures placed at surgery. These sutures will go away in one to two weeks. If the pain worsens, you should call the doctor.  Do I need to wear an eye patch?  Jerry should wear an eye patch to protect his eye as needed.   You do not need to wear an eye patch at home after the doctor has removed the patch on your first day after surgery. However, you may be more comfortable wearing a patch outside in the sun, when sleeping or napping, or in a damari, windy environment.  How much drainage should I have?  You may expect a moderate amount of drainage for a week. Gradually the drainage should decrease. The lids can be cleaned with a clean washcloth  and gentle soap or diluted baby shampoo. Wipe the eyelids gently from the nose outward. Some blood in the tears is a normal finding.  Will there be swelling?  Some swelling is normal for about a week or two after which it will gradually decrease. Applying a cool compress, using a clean washcloth for 5 - 10 minutes several times a day may reduce the swelling and make you more comfortable. People may have some swelling of both eyes, especially if face down positioning is required. The white part of the eye may appear very red or bloody for a week or two. This may get worse a few days after surgery. Though the bright red appearance can look frightening, it is a normal finding early after surgery and will resolve in a few weeks.  Will I need to use eye drops?  You will be using several different kinds of eye drops or ointment (salve) when you leave the hospital. The directions will be on each bottle or tube. The medication with the red top will keep your eye dilated and may make your eye more sensitive to light. Wearing sunglasses may help. The other medication is a combination antibiotic/steroid to prevent infection and promote healing.  Occasionally a third drop is used to control the pressure in your eye. A new bottle of artificial tears or lubricant ointment may be used along with your prescription eye drops after surgery. You will be using drops from four to eight weeks. Bring all eye medications (drops. ointments, or pills) with you  to each visit.   Always wash your hands before putting in the eye drops. You may wish to have someone else help you. Pull down on the lower lid and squeeze one drop from the bottle being careful not to touch the dropper to your eye or eyelid. One drop is sufficient, but another may be used if the first did not go into the eye. It is often easier to put in the drops if you are reclining or lying down. Wait five (5) minutes after the first drop before using the second drop to allow the  medications to absorb into the eye.  How long will it take for my vision to improve?  Your vision should gradually improve, but it may take up to six months to regain your best vision.    Are there any physical restrictions after surgery?  Jerry has no positioning restrictions.  When may I return to work or my normal activities?  Rest today.  You may go outside as usual. If conditions are windy or damari, wear an eye pad to avoid getting dust or dirt in the eye.  Can I travel?  Jerry has no restrictions  When can I shower and wash my hair?  Jerry can be bathed or shower tomorrow after the dressing comes off  But avoid getting water in your eye. You may want someone to help you shampoo your hair at first.  You may shave, brush your teeth, or comb your hair. Do not use make-up, mascara, or creams/lotions around your eye for several weeks.  When will I see the doctor again?  Generally, you will be seen the first day after surgery and again 1-2 weeks later. If you have not received a return appointment before leaving the hospital, you should call our office during the business hours to arrange an appointment. If you will be seeing your local doctor instead of us, you will need to call that office to set up an appointment.  How do I reach a doctor if I have concerns?  One of our doctors is available by calling [facility] [phone]. Please try to call for routine questions and prescription refills during business hours.  You should call your doctor if:  You notice a sudden decrease in your vision.  Have severe pain or pain increases rather than subsiding.    You notice a new black curtain over your eye that is not the gas bubble. If you have any of these symptoms, you may need to be examined.        2/14/14

## 2017-08-15 NOTE — PROGRESS NOTES
Post op pain assessment.  Pt non verbal, mom here with patient.  Pt moaning.  Per CRNA pt had some bloody drainage on extubation  - possibly from biting down hard on tube.  Pt given pain medicine, decision made together with Mom.

## 2017-08-15 NOTE — OR NURSING
Pt able to rest for extended periods of time, appears more comfortable when awake.  Less moaning and less restless.

## 2017-08-15 NOTE — OP NOTE
SURGEON: Laney Bardales M.D.  ASSISTANT SURGEON: Hernesto Dumont MD  PREOPERATIVE DIAGNOSES:   1. Retinal Detachment Right Eye  2. Possible dislocated lens Right Eye   POSTOPERATIVE DIAGNOSES:   1. Funnel chronic retinal detachment right eye  2. Dislocated posterior intraocular lens Right Eye   PROCEDURES:   1. 23 gauge pars plana vitrectomy, intraocular lens removal, capsular tension ring removal and peripheral iridectomy  2. exam under anesthesia both eyes   ANESTHESIA: General Anesthesia  COMPLICATIONS: None.   ESTIMATED BLOOD LOSS: Scant.   INDICATIONS: Jerry Ingram is a 31 year old year male patient with history of retinal detachment based on ultrasound and possible dislocated intraocular lens. The patient was initially referred by Dr. Beaver to Dr. Joyner with diagnosis of angle closure glaucoma, status post Cataract extraction intraocular lens right eye 6.2.17. Mother reports history of hitting himself in the right eye   Prior exam in the clinic was limited due to combative behavior secondary to mental disability. The patient is here for exam under anesthesia, possible retinal detachment repair, possible dislocated intraocular lens removal.   DESCRIPTION OF PROCEDURE: The patient was taken to the operating room where general anesthesia was administered by the Anesthesia Department.  The patient's operative eye was prepped and draped in the usual sterile surgical fashion for ophthalmic surgery.  A sterile drape was placed over the face and body and a lid speculum was inserted. The microscope was brought into the operative field.  Under the microscope it was noted anterior synechiae, cornea edema and a temporally dislocated intraocular lens.   A 23-gauge trocar was placed inferotemporally 4.0 mm posterior to the limbus.  the infusion cannula was connected but we were unable to verify the location. Two other 23-gauge trocars were placed superior nasal and superior temporally. The infusion cannula was placed  through the superiotemporal cannula and it's location was verified by direct illumination.  The Biom was used to assist with the view during the vitrectomy.   The vitrector handpiece and endoilluminator were placed in the eye. Upon entering the eye, the lens and capsule were examined and were dislocated. A capsular tension ring was identified. The retina was examined. There was vitreous hemorrhage. After the vitrectomy and removal of the vitreous hemorrhage, it was noted a total Retinal detachment with the inferior retina dragged anteriorly and superiorly over the superior retina where the anterior retina was attached to each other. The choroid was visualized inferiorly and the inferior retina was avulsed from the optic nerve head and only a small part of the superior nasal retina was attached. 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 The retinal detachment was inoperable.   The lens and the capsule were examined. Careful vitrectomy was performed around the dislocated intraocular lens. Two paracentesis were made in superior temporal and inferior temporal cornea. Viscoelastic was administered into the Anterior chamber through the paracenthesis. The superiortemporal incision was enlarged using a 2.8 mm keratome and the capsular tension ring was removed. The IOL was grasped using duet forceps and cut in half with duet microscissors. It was then removed through the superior temporal paracenthesis.    The anterior chamber was entered with the vitrector and a peripheral ididotomy was placed in the superotemproal iris.   The corneal wounds were closed using 10-0 nylon sutures. A partial fluid-air exchange was performed.  The trocars were removed and the sclerotomies were closed using 6-0 plain sutures. The conjunctiva was closed using 6-0 plain sutures. The wounds were watertight and the intraocular pressure was 10-15 mmHg.  Subconjunctival injections of  ceftazidime and dexamethasone were administered. 5ml of retrobulbar block consisting 1:1 mixture of 2% lidocaine and 0.75% Marcaine was placed through the subtenon space.      The lid speculum was removed. The eye was cleaned with wet and dry gauze. Maxitrol ointment was applied to the eye. A Sandoval shield were placed over the left eye.     Attention was turned to the left eye. A dilated fundus exam under anesthesia was performed. Peripapillary atrophy and Diffuse retinal atrophy (likely myopic degeneration) was noted. A small lattice degeneration was noted inferiorly but there were no retinal breaks or detachment.     The patient was discharged to the postoperative care unit in stable condition, having tolerated the procedure well.     The surgery was assisted by Dr. Hernesto Dumont, because of the delicate and complex nature of this surgery, Dr. Hernesto Dumont was required. Dr. Hernesto Dumont assisted with removal of the intraocular lens and vitrectomy. I was present for the entire surgery.

## 2017-08-15 NOTE — IP AVS SNAPSHOT
MRN:[id number]                      After Visit Summary   8/15/2017    Jerry Ingram    MRN: [id number]           Thank you!     Thank you for choosing [facility] for your care. Our goal is always to provide you with excellent care. Hearing back from our patients is one way we can continue to improve our services. Please take a few minutes to complete the written survey that you may receive in the mail after you visit with us. Thank you!        Patient Information     Date Of Birth          1985        About your hospital stay     You were admitted on:  August 15, 2017 You last received care in the:  [facility]    You were discharged on:  August 15, 2017       Who to Call     For medical emergencies, please call 911.  For non-urgent questions about your medical care, please call your primary care provider or clinic, None  For questions related to your surgery, please call your surgery clinic        Attending Provider     Provider Specialty    Laney Bardales MD Ophthalmology       Primary Care Provider    No Ref-Primary Verified      After Care Instructions     Activity       Avoid strenuous activities the next several days.            Discharge Instructions - Wear eye patch and eye shield        Until seen the next day                  Your next 10 appointments already scheduled     Aug 16, 2017  1:00 PM CDT   Post-Op with Laney Bardales MD   Eye Clinic ([facility])    [address]  [address] [facility]  [address] [id number] [phone]              Further instructions from your care team       [facility]esthesia [facility] Discharge  Instructions  Anesthesia (Eye Care Center)   Adult Discharge Instructions (General)    For 24 hours after surgery    1. Get plenty of rest.  Make arrangements to have a responsible adult stay with you for at least 24 hours.  2. Do not drive or use heavy equipment for 24  hours.    3. Do not drink alcohol for 24 hours.  4. Do not sign legal documents or make important decisions for 24 hours.  5. Avoid strenuous or risky activities. You may feel lightheaded.  If so, sit for a few minutes before standing.  Have someone help you get up.   6. General anesthesia patients should drink only clear liquids (apple juice, ginger ale, broth or 7-Up). Be sure to drink enough fluids.  Move to a regular diet as you feel able.  7. Any questions of medical nature, call your physician.  [facility]  Discharge Instruction    EyeSurgery [facility]    MD Laney Humphries MD Joseph Terry, MD  Will I have pain?  Some discomfort is normal and expected following surgery.   Taking Tylenol (acetaminophen) regularly may help prevent pain.  Discomfort should gradually decrease and Tylenol should be sufficient to relieve pain. A foreign body sensation in the cornea of the eye is very common and caused by sutures placed at surgery. These sutures will go away in one to two weeks. If the pain worsens, you should call the doctor.  Do I need to wear an eye patch?  Jerry should wear an eye patch to protect his eye as needed.   You do not need to wear an eye patch at home after the doctor has removed the patch on your first day after surgery. However, you may be more comfortable wearing a patch outside in the sun, when sleeping or napping, or in a damari, windy environment.  How much drainage should I have?  You may expect a moderate amount of drainage for a week. Gradually the drainage should decrease. The lids can be cleaned with a clean washcloth and gentle soap or diluted baby shampoo. Wipe the eyelids gently from the nose outward. Some blood in the tears is a normal finding.  Will there be swelling?  Some swelling is normal for about a week or two after which it will gradually decrease. Applying a cool compress, using a clean washcloth for 5 - 10 minutes several  times a day may reduce the swelling and make you more comfortable. People may have some swelling of both eyes, especially if face down positioning is required. The white part of the eye may appear very red or bloody for a week or two. This may get worse a few days after surgery. Though the bright red appearance can look frightening, it is a normal finding early after surgery and will resolve in a few weeks.  Will I need to use eye drops?  You will be using several different kinds of eye drops or ointment (salve) when you leave the hospital. The directions will be on each bottle or tube. The medication with the red top will keep your eye dilated and may make your eye more sensitive to light. Wearing sunglasses may help. The other medication is a combination antibiotic/steroid to prevent infection and promote healing.  Occasionally a third drop is used to control the pressure in your eye. A new bottle of artificial tears or lubricant ointment may be used along with your prescription eye drops after surgery. You will be using drops from four to eight weeks. Bring all eye medications (drops. ointments, or pills) with you  to each visit.   Always wash your hands before putting in the eye drops. You may wish to have someone else help you. Pull down on the lower lid and squeeze one drop from the bottle being careful not to touch the dropper to your eye or eyelid. One drop is sufficient, but another may be used if the first did not go into the eye. It is often easier to put in the drops if you are reclining or lying down. Wait five (5) minutes after the first drop before using the second drop to allow the medications to absorb into the eye.  How long will it take for my vision to improve?  Your vision should gradually improve, but it may take up to six months to regain your best vision.    Are there any physical restrictions after surgery?  Jerry has no positioning restrictions.  When may I return to work or my normal  activities?  Rest today.  You may go outside as usual. If conditions are windy or damari, wear an eye pad to avoid getting dust or dirt in the eye.  Can I travel?  Jerry has no restrictions  When can I shower and wash my hair?  Jerry can be bathed or shower tomorrow after the dressing comes off  But avoid getting water in your eye. You may want someone to help you shampoo your hair at first.  You may shave, brush your teeth, or comb your hair. Do not use make-up, mascara, or creams/lotions around your eye for several weeks.  When will I see the doctor again?  Generally, you will be seen the first day after surgery and again 1-2 weeks later. If you have not received a return appointment before leaving the hospital, you should call our office during the business hours to arrange an appointment. If you will be seeing your local doctor instead of us, you will need to call that office to set up an appointment.  How do I reach a doctor if I have concerns?  One of our doctors is available by calling [facility] [phone]. Please try to call for routine questions and prescription refills during business hours.  You should call your doctor if:  You notice a sudden decrease in your vision.  Have severe pain or pain increases rather than subsiding.    You notice a new black curtain over your eye that is not the gas bubble. If you have any of these symptoms, you may need to be examined.        2/14/14      Pending Results     No orders found from 8/13/2017 to 8/16/2017.            Admission Information     Date & Time Provider Department Dept. Phone    8/15/2017 Laney Bardales MD [facility] [place] [phone]      Your Vitals Were     Blood Pressure Temperature Respirations Height Weight Pulse Oximetry    101/72 98.3  F (36.8  C) (Temporal) 10 1.524 m (5') 45.4 kg (100 lb) 100%    BMI (Body Mass Index)                   19.53 kg/m2           MyChart Information     MyChart  "lets you send messages to your doctor, view your test results, renew your prescriptions, schedule appointments and more. To sign up, go to [email or web address] . Click on \"Log in\" on the left side of the screen, which will take you to the Welcome page. Then click on \"Sign up Now\" on the right side of the page.     You will be asked to enter the access code listed below, as well as some personal information. Please follow the directions to create your username and password.     Your access code is: [email or web address]  Expires: 10/18/2017  6:30 AM     Your access code will  in 90 days. If you need help or a new code, please call your [facility] clinic or [phone].        Care EveryWhere ID     This is your Care EveryWhere ID. This could be used by other organizations to access your [facility] medical records  [id number]        Equal Access to Services     ROME BAKER : Ashwin Osullivan, wamaude platt, qasheba kaalmablayne smith, tiffanie beaver . So [facility] [phone].    ATENCIÓN: Si habla español, tiene a garrido disposición servicios gratuitos de asistencia lingüística. Llame al [phone].    We comply with applicable federal civil rights laws and [place] laws. We do not discriminate on the basis of race, color, national origin, age, disability sex, sexual orientation or gender identity.               Review of your medicines      START taking        Dose / Directions    acetaminophen 32 mg/mL solution   Commonly known as:  TYLENOL   Used for:  S/P eye surgery        Dose:  325 mg   Take 10.15 mLs (325 mg) by mouth every 4 hours as needed for fever or mild pain   Quantity:  120 mL   Refills:  0       ofloxacin 0.3 % ophthalmic solution   Commonly known as:  OCUFLOX   Used for:  S/P eye surgery        Dose:  1 drop   Apply 1 drop to eye 4 times daily Instill into operative eye(s) per physician instructions.   Quantity:  5 mL   Refills:  0         CONTINUE these medicines " which may have CHANGED, or have new prescriptions. If we are uncertain of the size of tablets/capsules you have at home, strength may be listed as something that might have changed.        Dose / Directions    * prednisoLONE acetate 1 % ophthalmic susp   Commonly known as:  PRED FORTE   This may have changed:  Another medication with the same name was added. Make sure you understand how and when to take each.   Used for:  Secondary iridocyclitis, noninfectious, right        Dose:  1 drop   Place 1 drop into the right eye 4 times daily   Quantity:  15 Bottle   Refills:  3       * prednisoLONE acetate 1 % ophthalmic susp   Commonly known as:  PRED FORTE   This may have changed:  You were already taking a medication with the same name, and this prescription was added. Make sure you understand how and when to take each.   Used for:  S/P eye surgery        Dose:  1 drop   Apply 1 drop to eye 4 times daily Instill into operative eye(s) per physician instructions.   Quantity:  5 mL   Refills:  0       * Notice:  This list has 2 medication(s) that are the same as other medications prescribed for you. Read the directions carefully, and ask your doctor or other care provider to review them with you.      CONTINUE these medicines which have NOT CHANGED        Dose / Directions    atropine 1 % ophthalmic solution   Used for:  Secondary iridocyclitis, noninfectious, right        Dose:  1 drop   Place 1 drop into the right eye 2 times daily   Quantity:  1 Bottle   Refills:  11       BACLOFEN PO        Take by mouth 4 times daily   Refills:  0       CLONIDINE HCL PO        Take by mouth 2 times daily   Refills:  0       diazepam 1 MG/ML solution   Commonly known as:  VALIUM        Take by mouth every 8 hours as needed for anxiety Take 3 drops as needed for seizure if longer than 2-3 minutes   Refills:  0       GLYCOPYRROLATE PO        Dose:  1 mg   Take 1 mg by mouth 3 times daily   Refills:  0       KEPPRA 100 MG/ML solution    Generic drug:  levETIRAcetam        20ML TWICE DAILY   Refills:  0       lacosamide 10 MG/ML Soln solution   Commonly known as:  VIMPAT        Dose:  200 mg   Take 200 mg by mouth 2 times daily   Refills:  0       LORazepam 0.5 MG tablet   Commonly known as:  ATIVAN        Take by mouth as needed   Refills:  0       tiZANidine 4 MG capsule   Commonly known as:  ZANAFLEX        Take by mouth 3 times daily   Refills:  0            Where to get your medicines      These medications were sent to [facility] - [address]  [address] [facility] [address], [place] [address]     Phone:  [phone]     acetaminophen 32 mg/mL solution    ofloxacin 0.3 % ophthalmic solution    prednisoLONE acetate 1 % ophthalmic susp                Protect others around you: Learn how to safely use, store and throw away your medicines at www.disposemymeds.org.             Medication List: This is a list of all your medications and when to take them. Check marks below indicate your daily home schedule. Keep this list as a reference.      Medications           Morning Afternoon Evening Bedtime As Needed    acetaminophen 32 mg/mL solution   Commonly known as:  TYLENOL   Take 10.15 mLs (325 mg) by mouth every 4 hours as needed for fever or mild pain                                atropine 1 % ophthalmic solution   Place 1 drop into the right eye 2 times daily   Last time this was given:  1 drop on 8/15/2017 11:04 AM                                BACLOFEN PO   Take by mouth 4 times daily                                CLONIDINE HCL PO   Take by mouth 2 times daily                                diazepam 1 MG/ML solution   Commonly known as:  VALIUM   Take by mouth every 8 hours as needed for anxiety Take 3 drops as needed for seizure if longer than 2-3 minutes                                GLYCOPYRROLATE PO   Take 1 mg by mouth 3 times daily                                KEPPRA 100 MG/ML solution   20ML TWICE DAILY    Generic drug:  levETIRAcetam                                lacosamide 10 MG/ML Soln solution   Commonly known as:  VIMPAT   Take 200 mg by mouth 2 times daily                                LORazepam 0.5 MG tablet   Commonly known as:  ATIVAN   Take by mouth as needed                                ofloxacin 0.3 % ophthalmic solution   Commonly known as:  OCUFLOX   Apply 1 drop to eye 4 times daily Instill into operative eye(s) per physician instructions.                                * prednisoLONE acetate 1 % ophthalmic susp   Commonly known as:  PRED FORTE   Place 1 drop into the right eye 4 times daily                                * prednisoLONE acetate 1 % ophthalmic susp   Commonly known as:  PRED FORTE   Apply 1 drop to eye 4 times daily Instill into operative eye(s) per physician instructions.                                tiZANidine 4 MG capsule   Commonly known as:  ZANAFLEX   Take by mouth 3 times daily                                * Notice:  This list has 2 medication(s) that are the same as other medications prescribed for you. Read the directions carefully, and ask your doctor or other care provider to review them with you.

## 2017-08-15 NOTE — ANESTHESIA PREPROCEDURE EVALUATION
Anesthesia Evaluation     .             ROS/MED HX    ENT/Pulmonary:      (-) sleep apnea   Neurologic:     (+)seizures other neuro Cerebral palsy    Cardiovascular:         METS/Exercise Tolerance:     Hematologic:         Musculoskeletal:         GI/Hepatic:        (-) GERD   Renal/Genitourinary:         Endo:         Psychiatric:         Infectious Disease:         Malignancy:         Other:                                    Anesthesia Plan      History & Physical Review  History and physical reviewed and following examination; no interval change.    ASA Status:  2 .    NPO Status:  > 8 hours    Plan for General and LMA with Intravenous induction. Maintenance will be Balanced.    PONV prophylaxis:  Ondansetron (or other 5HT-3) and Dexamethasone or Solumedrol  Valium in g-tube for sedation prior to IV start      Postoperative Care  Postoperative pain management:  Oral pain medications and IV analgesics.      Consents  Anesthetic plan, risks, benefits and alternatives discussed with:  Patient..                      Procedure: Procedure(s):  VITRECTOMY PARSPLANA WITH 23 GAUGE SYSTEM  EXAM UNDER ANESTHESIA EYE(S)  Preop diagnosis: RIGHT EYE RETINAL DETACHMENT    No Known Allergies  Past Medical History:   Diagnosis Date     Cerebral palsy (H)      Glaucoma (increased eye pressure)      Seizure disorder (H)      Past Surgical History:   Procedure Laterality Date     CATARACT IOL, RT/LT Right      GASTROSTOMY TUBE       Prior to Admission medications    Medication Sig Start Date End Date Taking? Authorizing Provider   Lacosamide (VIMPAT PO) Take by mouth 2 times daily    Reported, Patient   tiZANidine (ZANAFLEX) 4 MG capsule Take by mouth 3 times daily    Reported, Patient   atropine 1 % ophthalmic solution Place 1 drop into the right eye 2 times daily 8/3/17   Anna Joyner MD   prednisoLONE acetate (PRED FORTE) 1 % ophthalmic susp Place 1 drop into the right eye 4 times daily 8/3/17   Anna Joyner MD    predniSONE (DELTASONE) 20 MG tablet Take 1 tablet by mouth daily. 8/26/11   Mukund Montana MD   KEPPRA 100 MG/ML OR SOLN 15ML TWICE DAILY 3/17/10   Reported, Patient   LORAZEPAM 0.5 MG OR TABS Take by mouth as needed 3/17/10   Reported, Patient   CLONIDINE HCL OR Take by mouth 2 times daily 3/17/10   Reported, Patient   BACLOFEN OR Take by mouth 4 times daily 3/17/10   Reported, Patient     Current Facility-Administered Medications Ordered in Epic   Medication Dose Route Frequency Last Rate Last Dose     cyclopentolate (CYCLOGYL) 1 % ophthalmic solution 1 drop  1 drop Ophthalmic q5 Min Prior to Surgery         phenylephrine (MYDFRIN /LEONEL-SYNEPHRINE) 2.5 % ophthalmic solution 1 drop  1 drop Ophthalmic q5 Min Prior to Surgery         tropicamide (MYDRIACYL) 1 % ophthalmic solution 1 drop  1 drop Ophthalmic q5 Min Prior to Surgery         povidone-iodine 5 % ophthalmic solution   Ophthalmic Once         bupivacaine 0.75% (pf) 4.5mL + lidocaine 2% (pf) 4.5mL + hyaluronidase (HYLENEX) 150 units   Retrobulbar Once         No current [facility] outpatient prescriptions on file.     Wt Readings from Last 1 Encounters:   No data found for Wt     Temp Readings from Last 1 Encounters:   08/26/11 37.2  C (99  F)     BP Readings from Last 6 Encounters:   08/26/11 112/72   03/01/11 121/85     Pulse Readings from Last 4 Encounters:   No data found for Pulse     Resp Readings from Last 1 Encounters:   08/26/11 18     SpO2 Readings from Last 1 Encounters:   08/26/11 100%     No results for input(s): NA, POTASSIUM, CHLORIDE, CO2, ANIONGAP, GLC, BUN, CR, CECILLE in the last 45136 hours.  No results for input(s): AST, ALT in the last 76906 hours.    Invalid input(s): ALP, BILT, LPSE  No results for input(s): WBC, HGB, PLT in the last 65336 hours.  No results for input(s): INR in the last 38664 hours.    Invalid input(s): APTT   No results for input(s): TROPI in the last 49671 hours.  RECENT LABS:   ECG:   ECHO:   CXR:

## 2017-08-15 NOTE — ANESTHESIA POSTPROCEDURE EVALUATION
Patient: Jerry Ingram    Procedure(s):  RIGHT EYE VITRECTOMY PARSPLANA WITH 23 GAUGE SYSTEM, EXAM UNDER ANESTHESIA, AIR/FLUID EXCHANGE, REMOVAL OF INTRAOCULAR LENS AND CAPSULAR TENSION RING    LEFT EYE EXAM UNDER ANESTHESIA - Wound Class: I-Clean   - Wound Class: I-Clean    Diagnosis:RIGHT EYE RETINAL DETACHMENT  Diagnosis Additional Information: No value filed.    Anesthesia Type:  General, LMA    Note:  Anesthesia Post Evaluation    Patient location during evaluation: PACU  Patient participation: Able to fully participate in evaluation  Level of consciousness: awake  Pain management: adequate  Airway patency: patent  Cardiovascular status: acceptable  Respiratory status: acceptable  Hydration status: acceptable  PONV: none     Anesthetic complications: None          Last vitals:  Vitals:    08/15/17 1130 08/15/17 1215 08/15/17 1230   BP: 107/69  101/72   Resp: 17 10    Temp:      SpO2: 100%           Electronically Signed By: Dona Gomez MD, MD  August 15, 2017  12:38 PM

## 2017-08-15 NOTE — OR NURSING
Discharge teaching done with Mom in Phase I recovery.  Pt meets discharge criteria and ready for Phase II.

## 2017-08-15 NOTE — OR NURSING
Patient not likely to co-operative with I.V establishment, anaesthetist decided it will possibly be started at the OR after a little bit of anaesthesia possible via the g-tube

## 2017-08-16 ENCOUNTER — OFFICE VISIT (OUTPATIENT)
Dept: OPHTHALMOLOGY | Facility: CLINIC | Age: 32
End: 2017-08-16
Attending: OPHTHALMOLOGY
Payer: MEDICAID

## 2017-08-16 DIAGNOSIS — Z98.890 POSTOPERATIVE EYE STATE: Primary | ICD-10-CM

## 2017-08-16 PROCEDURE — 99213 OFFICE O/P EST LOW 20 MIN: CPT | Mod: ZF

## 2017-08-16 RX ORDER — PREDNISOLONE ACETATE 10 MG/ML
1 SUSPENSION/ DROPS OPHTHALMIC
Qty: 1 BOTTLE | Refills: 0 | Status: SHIPPED | OUTPATIENT
Start: 2017-08-16 | End: 2017-08-24

## 2017-08-16 RX ORDER — NEOMYCIN SULFATE, POLYMYXIN B SULFATE, AND DEXAMETHASONE 3.5; 10000; 1 MG/G; [USP'U]/G; MG/G
1 OINTMENT OPHTHALMIC AT BEDTIME
Qty: 1 TUBE | Refills: 1 | Status: SHIPPED | OUTPATIENT
Start: 2017-08-16 | End: 2019-08-07

## 2017-08-16 ASSESSMENT — SLIT LAMP EXAM - LIDS: COMMENTS: NORMAL

## 2017-08-16 ASSESSMENT — TONOMETRY
IOP_METHOD: ICARE
OD_IOP_MMHG: 07
OS_IOP_MMHG: 14

## 2017-08-16 ASSESSMENT — VISUAL ACUITY
METHOD: SNELLEN - LINEAR
OD_SC: UNABLE
OS_SC: UNABLE

## 2017-08-16 NOTE — NURSING NOTE
Chief Complaints and History of Present Illnesses   Patient presents with     Post Op (Ophthalmology) Right Eye     HPI    Symptoms:           Do you have eye pain now?:  Yes   Location:  OD   Pain Level:  Mild Pain (2)      Comments:  One day POP right eye.    The patient's Mom states he has low level right eye pain.   HALEIGH Pierson 1:10 PM 08/16/2017

## 2017-08-16 NOTE — PROGRESS NOTES
Postoperative day 1 status post 23g Pars plana vitrectomy (PPV)/ endolaser/ intraocular lens removal and capsular tension ring removal right eye right eye 8.15.17  Patient with chronic Retinal detachment  And dislocated intraocular lens   Intra-op findings: vitreous hemorrhage,total Retinal detachment with the inferior retina dragged anteriorly and superiorly over the superior retina where the anterior retina was attached to each other. The choroid was visualized inferiorly and the inferior retina was avulsed from the optic nerve head and only a small part of the superior nasal retina was attached. 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 The retinal detachment was inoperable.     Slept well  Mod K edema  No eye pain    Plan:  Position: as desidres  No heavy lifting   Glasses at all times  Avoid touching the eye  Retina detachment and endophthalmitis precautions were discussed with the patient and was asked to return if any of the those occur    Medications to operative eye  Predforte  four times a day    Ofloxacin QID  Maxitrol oint at bedtime  Atropine once a day     Follow up in one week    Yuan Franco MD  PGY3, Dept of Ophthalmology  Pager [phone]    ~~~~~~~~~~~~~~~~~~~~~~~~~~~~~~~~~~   Complete documentation of historical and exam elements from today's encounter can be found in the full encounter summary report (not reduplicated in this progress note).  I personally obtained the chief complaint(s) and history of present illness.  I confirmed and edited as necessary the review of systems, past medical/surgical history, family history, social history, and examination findings as documented by others; and I examined the patient myself.  I personally reviewed the relevant tests, images, and reports as documented above.  I formulated and edited as necessary the assessment and plan and discussed the findings and management plan with the  patient and family    Laney Bardales MD  .  Retina Service   Department of Ophthalmology and Visual Neurosciences   [facility]  Phone: [phone]   Fax: [phone]

## 2017-08-16 NOTE — MR AVS SNAPSHOT
After Visit Summary   2017    Jerry Ingram    MRN: [id number]           Patient Information     Date Of Birth          1985        Visit Information        Provider Department      2017 1:00 PM Laney Bardales MD [facility]        Today's Diagnoses     Postoperative eye state    -  1       Follow-ups after your visit        Follow-up notes from your care team     Return in about 1 week (around 2017) for post op RE PPV, IOL removal.      Your next 10 appointments already scheduled     Aug 24, 2017  1:00 PM CDT   Post-Op with Laney Bardales MD   Eye Clinic ([facility])    [address]  [address]  9[address]  [facility] [address] [phone]              Who to contact     Please call your clinic at [phone] to:    Ask questions about your health    Make or cancel appointments    Discuss your medicines    Learn about your test results    Speak to your doctor   If you have compliments or concerns about an experience at your clinic, or if you wish to file a complaint, please contact [facility] Patient Relations at [phone] or email us at [email or web address]         Additional Information About Your Visit        MyChart Information     Myfacepaget is an electronic gateway that provides easy, online access to your medical records. With Destineer, you can request a clinic appointment, read your test results, renew a prescription or communicate with your care team.     To sign up for Myfacepaget visit the website at [email or web address]   You will be asked to enter the access code listed below, as well as some personal information. Please follow the directions to create your username and password.     Your access code is: [email or web address]  Expires: 10/18/2017  6:30 AM     Your access code will  in 90 days. If you need help or a new code, please contact your [facility]  Clinic or call [phone] for assistance.        Care EveryWhere ID     This is your Care EveryWhere ID. This could be used by other organizations to access your [place] medical records  [id number]         Blood Pressure from Last 3 Encounters:   08/15/17 (P) 109/74   08/26/11 112/72   03/01/11 121/85    Weight from Last 3 Encounters:   08/15/17 45.4 kg (100 lb)              Today, you had the following     No orders found for display         Today's Medication Changes          These changes are accurate as of: 8/16/17  1:53 PM.  If you have any questions, ask your nurse or doctor.               Start taking these medicines.        Dose/Directions    neomycin-polymyxin-dexamethasone 3.5-96536-1.1 Oint ophthalmic ointment   Commonly known as:  MAXITROL   Used for:  Postoperative eye state   Started by:  Laney Bardales MD        Dose:  1 Application   Place 1 Application into the right eye At Bedtime   Quantity:  1 Tube   Refills:  1         These medicines have changed or have updated prescriptions.        Dose/Directions    * prednisoLONE acetate 1 % ophthalmic susp   Commonly known as:  PRED FORTE   This may have changed:  Another medication with the same name was added. Make sure you understand how and when to take each.   Used for:  Secondary iridocyclitis, noninfectious, right   Changed by:  Anna Joyner MD        Dose:  1 drop   Place 1 drop into the right eye 4 times daily   Quantity:  15 Bottle   Refills:  3       * prednisoLONE acetate 1 % ophthalmic susp   Commonly known as:  PRED FORTE   This may have changed:  Another medication with the same name was added. Make sure you understand how and when to take each.   Used for:  S/P eye surgery        Dose:  1 drop   Apply 1 drop to eye 4 times daily Instill into operative eye(s) per physician instructions.   Quantity:  5 mL   Refills:  0       * prednisoLONE acetate 1 % ophthalmic susp   Commonly known as:  PRED FORTE   This may have  changed:  You were already taking a medication with the same name, and this prescription was added. Make sure you understand how and when to take each.   Used for:  Postoperative eye state   Changed by:  Laney Bardales MD        Dose:  1 drop   Place 1 drop into the right eye every 4 hours (while awake) for 7 days   Quantity:  1 Bottle   Refills:  0       * Notice:  This list has 3 medication(s) that are the same as other medications prescribed for you. Read the directions carefully, and ask your doctor or other care provider to review them with you.         Where to get your medicines      These medications were sent to [facility] [id number] [address] AT SEC OF [facility]  [address] [facility] [address]     Phone:  [phone]     neomycin-polymyxin-dexamethasone 3.5-25393-6.1 Oint ophthalmic ointment    prednisoLONE acetate 1 % ophthalmic susp                Primary Care Provider    No Ref-Primary Verified       No address on file        Equal Access to Services     ROME BAKER AH: Hadii yudy vano Sojulio, waaxda luqadaha, qaybta kaalmada adeegyablayne, tiffanie lopez. So [facility] [phone].    ATENCIÓN: Si sweetiela espteddy, tiene a garrido disposición servicios gratuitos de asistencia lingüística. Llame al [phone].    We comply with applicable federal civil rights laws and [place] laws. We do not discriminate on the basis of race, color, national origin, age, disability sex, sexual orientation or gender identity.            Thank you!     Thank you for choosing EYE CLINIC  for your care. Our goal is always to provide you with excellent care. Hearing back from our patients is one way we can continue to improve our services. Please take a few minutes to complete the written survey that you may receive in the mail after your visit with us. Thank you!             Your Updated Medication List - Protect others around you: Learn how to  safely use, store and throw away your medicines at www.disposemymeds.org.          This list is accurate as of: 8/16/17  1:53 PM.  Always use your most recent med list.                   Brand Name Dispense Instructions for use Diagnosis    acetaminophen 32 mg/mL solution    TYLENOL    120 mL    Take 10.15 mLs (325 mg) by mouth every 4 hours as needed for fever or mild pain    S/P eye surgery       atropine 1 % ophthalmic solution     1 Bottle    Place 1 drop into the right eye 2 times daily    Secondary iridocyclitis, noninfectious, right       BACLOFEN PO      Take by mouth 4 times daily        CLONIDINE HCL PO      Take by mouth 2 times daily        diazepam 1 MG/ML solution    VALIUM     Take by mouth every 8 hours as needed for anxiety Take 3 drops as needed for seizure if longer than 2-3 minutes        GLYCOPYRROLATE PO      Take 1 mg by mouth 3 times daily        KEPPRA 100 MG/ML solution   Generic drug:  levETIRAcetam      20ML TWICE DAILY        lacosamide 10 MG/ML Soln solution    VIMPAT     Take 200 mg by mouth 2 times daily        LORazepam 0.5 MG tablet    ATIVAN     Take by mouth as needed        neomycin-polymyxin-dexamethasone 3.5-56335-9.1 Oint ophthalmic ointment    MAXITROL    1 Tube    Place 1 Application into the right eye At Bedtime    Postoperative eye state       ofloxacin 0.3 % ophthalmic solution    OCUFLOX    5 mL    Apply 1 drop to eye 4 times daily Instill into operative eye(s) per physician instructions.    S/P eye surgery       * prednisoLONE acetate 1 % ophthalmic susp    PRED FORTE    15 Bottle    Place 1 drop into the right eye 4 times daily    Secondary iridocyclitis, noninfectious, right       * prednisoLONE acetate 1 % ophthalmic susp    PRED FORTE    5 mL    Apply 1 drop to eye 4 times daily Instill into operative eye(s) per physician instructions.    S/P eye surgery       * prednisoLONE acetate 1 % ophthalmic susp    PRED FORTE    1 Bottle    Place 1 drop into the right eye  every 4 hours (while awake) for 7 days    Postoperative eye state       tiZANidine 4 MG capsule    ZANAFLEX     Take by mouth 3 times daily        * Notice:  This list has 3 medication(s) that are the same as other medications prescribed for you. Read the directions carefully, and ask your doctor or other care provider to review them with you.

## 2017-08-24 ENCOUNTER — OFFICE VISIT (OUTPATIENT)
Dept: OPHTHALMOLOGY | Facility: CLINIC | Age: 32
End: 2017-08-24
Attending: OPHTHALMOLOGY
Payer: MEDICAID

## 2017-08-24 DIAGNOSIS — Z98.890 POSTOPERATIVE EYE STATE: ICD-10-CM

## 2017-08-24 PROCEDURE — 99213 OFFICE O/P EST LOW 20 MIN: CPT | Mod: ZF

## 2017-08-24 RX ORDER — PREDNISOLONE ACETATE 10 MG/ML
1 SUSPENSION/ DROPS OPHTHALMIC
Qty: 1 BOTTLE | Refills: 0 | Status: SHIPPED | OUTPATIENT
Start: 2017-08-24 | End: 2022-11-01

## 2017-08-24 ASSESSMENT — EXTERNAL EXAM - LEFT EYE: OS_EXAM: NORMAL

## 2017-08-24 ASSESSMENT — VISUAL ACUITY
OS_SC: UNABLE
OD_SC: UNABLE
METHOD: SNELLEN - LINEAR

## 2017-08-24 ASSESSMENT — SLIT LAMP EXAM - LIDS
COMMENTS: NORMAL
COMMENTS: NORMAL

## 2017-08-24 ASSESSMENT — TONOMETRY
OD_IOP_MMHG: 10
IOP_METHOD: ICARE
OS_IOP_MMHG: --

## 2017-08-24 ASSESSMENT — EXTERNAL EXAM - RIGHT EYE: OD_EXAM: NORMAL

## 2017-08-24 NOTE — NURSING NOTE
Chief Complaints and History of Present Illnesses   Patient presents with     Post Op (Ophthalmology) Right Eye     s/p 23g pars plana vitrectomy (PPV)/ endolaser/ intraocular lens removal and capsular tension ring removal right eye 8.15.17     HPI    Affected eye(s):  Right   Symptoms:        Duration:  1 week      Do you have eye pain now?:  No      Comments:  1 week s/p PPV/ endolaser/intraocular lens removal and capsular tension ring removal right eye 8.15.17  Pt's mom with today - giving history for pt    Ocular meds:  Maxitrol oint at bedtime  Atropine being used twice a day  Using pred forte or ofloxacin QID, but not using both drops QID (per aide with pt)    Lynsey Reddy COA 1:19 PM August 24, 2017

## 2017-08-24 NOTE — MR AVS SNAPSHOT
After Visit Summary   2017    Jrery Ingram    MRN: [id number]           Patient Information     Date Of Birth          1985        Visit Information        Provider Department      2017 1:00 PM Laney Bardales MD Eye Clinic        Today's Diagnoses     Postoperative eye state           Follow-ups after your visit        Follow-up notes from your care team     Return in about 2 weeks (around 2017).      Your next 10 appointments already scheduled     Sep 13, 2017  1:45 PM CDT   Post-Op with Laney Bardales MD   Eye Clinic ([facility])    [address]  [address]  9[address] Clin [address]a  [facility] [id number] [phone]              Who to contact     Please call your clinic at [phone] to:    Ask questions about your health    Make or cancel appointments    Discuss your medicines    Learn about your test results    Speak to your doctor   If you have compliments or concerns about an experience at your clinic, or if you wish to file a complaint, please contact [facility] Patient Relations at [phone] or email us at [email or web address]         Additional Information About Your Visit        MyChart Information     Avot Mediahart is an electronic gateway that provides easy, online access to your medical records. With Membersuite, you can request a clinic appointment, read your test results, renew a prescription or communicate with your care team.     To sign up for CareXtendt visit the website at [email or web address]   You will be asked to enter the access code listed below, as well as some personal information. Please follow the directions to create your username and password.     Your access code is: [email or web address]  Expires: 10/18/2017  6:30 AM     Your access code will  in 90 days. If you need help or a new code, please contact your [facility] Clinic or call [phone] for  assistance.        Care EveryWhere ID     This is your Care EveryWhere ID. This could be used by other organizations to access your [place] medical records  [id number]         Blood Pressure from Last 3 Encounters:   08/15/17 (P) 109/74   08/26/11 112/72   03/01/11 121/85    Weight from Last 3 Encounters:   08/15/17 45.4 kg (100 lb)              Today, you had the following     No orders found for display         Where to get your medicines      These medications were sent to [facility] [id number] [facility] AT SEC OF [facility]  [address] [facility] [address]     Phone:  [phone]     prednisoLONE acetate 1 % ophthalmic susp          Primary Care Provider    No Ref-Primary Verified       No address on file        Equal Access to Services     ROME BAKER : Ashwin harrington Sojulio, waaxblayne luqadaha, qaybta kaalmada sarah, tiffanie lopez. So [facility] [phone].    ATENCIÓN: Si habla español, tiene a garrido disposición servicios gratuitos de asistencia lingüística. Llame al [phone].    We comply with applicable federal civil rights laws and [place] laws. We do not discriminate on the basis of race, color, national origin, age, disability sex, sexual orientation or gender identity.            Thank you!     Thank you for choosing EYE CLINIC  for your care. Our goal is always to provide you with excellent care. Hearing back from our patients is one way we can continue to improve our services. Please take a few minutes to complete the written survey that you may receive in the mail after your visit with us. Thank you!             Your Updated Medication List - Protect others around you: Learn how to safely use, store and throw away your medicines at www.disposemymeds.org.          This list is accurate as of: 8/24/17  2:19 PM.  Always use your most recent med list.                   Brand Name Dispense Instructions for use  Diagnosis    acetaminophen 32 mg/mL solution    TYLENOL    120 mL    Take 10.15 mLs (325 mg) by mouth every 4 hours as needed for fever or mild pain    S/P eye surgery       atropine 1 % ophthalmic solution     1 Bottle    Place 1 drop into the right eye 2 times daily    Secondary iridocyclitis, noninfectious, right       BACLOFEN PO      Take by mouth 4 times daily        CLONIDINE HCL PO      Take by mouth 2 times daily        diazepam 1 MG/ML solution    VALIUM     Take by mouth every 8 hours as needed for anxiety Take 3 drops as needed for seizure if longer than 2-3 minutes        GLYCOPYRROLATE PO      Take 1 mg by mouth 3 times daily        KEPPRA 100 MG/ML solution   Generic drug:  levETIRAcetam      20ML TWICE DAILY        lacosamide 10 MG/ML Soln solution    VIMPAT     Take 200 mg by mouth 2 times daily        LORazepam 0.5 MG tablet    ATIVAN     Take by mouth as needed        neomycin-polymyxin-dexamethasone 3.5-57567-6.1 Oint ophthalmic ointment    MAXITROL    1 Tube    Place 1 Application into the right eye At Bedtime    Postoperative eye state       ofloxacin 0.3 % ophthalmic solution    OCUFLOX    5 mL    Apply 1 drop to eye 4 times daily Instill into operative eye(s) per physician instructions.    S/P eye surgery       * prednisoLONE acetate 1 % ophthalmic susp    PRED FORTE    15 Bottle    Place 1 drop into the right eye 4 times daily    Secondary iridocyclitis, noninfectious, right       * prednisoLONE acetate 1 % ophthalmic susp    PRED FORTE    5 mL    Apply 1 drop to eye 4 times daily Instill into operative eye(s) per physician instructions.    S/P eye surgery       * prednisoLONE acetate 1 % ophthalmic susp    PRED FORTE    1 Bottle    Place 1 drop into the right eye every 4 hours (while awake)    Postoperative eye state       tiZANidine 4 MG capsule    ZANAFLEX     Take by mouth 3 times daily        * Notice:  This list has 3 medication(s) that are the same as other medications prescribed for  you. Read the directions carefully, and ask your doctor or other care provider to review them with you.

## 2017-08-24 NOTE — PROGRESS NOTES
Postoperative day 9 status post 23g Pars plana vitrectomy (PPV)/ endolaser/ intraocular lens removal and capsular tension ring removal right eye right eye 8.15.17  Patient with chronic Retinal detachment  And dislocated intraocular lens   Intra-op findings: vitreous hemorrhage,total Retinal detachment with the inferior retina dragged anteriorly and superiorly over the superior retina where the anterior retina was attached to each other. The choroid was visualized inferiorly and the inferior retina was avulsed from the optic nerve head and only a small part of the superior nasal retina was attached. 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 The retinal detachment was inoperable.     Mod K edema and  Mild hyphema  No eye pain    Plan:  Position: as desidres  Glasses at all times  Avoid touching the eye  Retina detachment and endophthalmitis precautions were discussed with the patient and was asked to return if any of the those occur    Medications to operative eye  Predforte  four times a day    Ofloxacin QID  Maxitrol oint at bedtime  Atropine once a day     Follow up in 2 weeks  ~~~~~~~~~~~~~~~~~~~~~~~~~~~~~~~~~~   Complete documentation of historical and exam elements from today's encounter can be found in the full encounter summary report (not reduplicated in this progress note).  I personally obtained the chief complaint(s) and history of present illness.  I confirmed and edited as necessary the review of systems, past medical/surgical history, family history, social history, and examination findings as documented by others; and I examined the patient myself.  I personally reviewed the relevant tests, images, and reports as documented above.  I formulated and edited as necessary the assessment and plan and discussed the findings and management plan with the patient and family    Laney Bardales MD  .  Retina Service    Department of Ophthalmology and Visual Neurosciences   [facility]  Phone: [phone]   Fax: [phone]

## 2017-09-13 ENCOUNTER — OFFICE VISIT (OUTPATIENT)
Dept: OPHTHALMOLOGY | Facility: CLINIC | Age: 32
End: 2017-09-13
Attending: OPHTHALMOLOGY
Payer: MEDICAID

## 2017-09-13 DIAGNOSIS — Z48.810 AFTERCARE FOLLOWING SURGERY OF A SENSORY ORGAN: Primary | ICD-10-CM

## 2017-09-13 PROCEDURE — 99212 OFFICE O/P EST SF 10 MIN: CPT | Mod: ZF

## 2017-09-13 ASSESSMENT — VISUAL ACUITY
METHOD: SNELLEN - LINEAR
OD_SC: UNABLE
OS_SC: UNABLE

## 2017-09-13 ASSESSMENT — TONOMETRY
OS_IOP_MMHG: 07
IOP_METHOD: ICARE
OD_IOP_MMHG: 06

## 2017-09-13 ASSESSMENT — EXTERNAL EXAM - RIGHT EYE: OD_EXAM: NORMAL

## 2017-09-13 ASSESSMENT — SLIT LAMP EXAM - LIDS
COMMENTS: NORMAL
COMMENTS: NORMAL

## 2017-09-13 ASSESSMENT — EXTERNAL EXAM - LEFT EYE: OS_EXAM: NORMAL

## 2017-09-13 NOTE — MR AVS SNAPSHOT
After Visit Summary   2017    Jerry Ingram    MRN: [id number]           Patient Information     Date Of Birth          1985        Visit Information        Provider Department      2017 1:45 PM Laney Bardales MD Eye Clinic         Follow-ups after your visit        Your next 10 appointments already scheduled     Oct 12, 2017  1:00 PM CDT   Post-Op with Laney Bardales MD   Eye Clinic ([facility])    [address]  [address]  9[address]  [facility] [address]   [phone]              Who to contact     Please call your clinic at [phone] to:    Ask questions about your health    Make or cancel appointments    Discuss your medicines    Learn about your test results    Speak to your doctor   If you have compliments or concerns about an experience at your clinic, or if you wish to file a complaint, please contact [facility] Patient Relations at [phone] or email us at [email or web address]         Additional Information About Your Visit        GreenDot Transhart Information     HiGear is an electronic gateway that provides easy, online access to your medical records. With HiGear, you can request a clinic appointment, read your test results, renew a prescription or communicate with your care team.     To sign up for HiGear visit the website at [email or web address]   You will be asked to enter the access code listed below, as well as some personal information. Please follow the directions to create your username and password.     Your access code is: [email or web address]  Expires: 10/18/2017  6:30 AM     Your access code will  in 90 days. If you need help or a new code, please contact your [facility] or call [phone] for assistance.        Care EveryWhere ID     This is your Care EveryWhere ID. This could be used by other organizations to access your [place]  medical records  [id number]         Blood Pressure from Last 3 Encounters:   08/15/17 (P) 109/74   08/26/11 112/72   03/01/11 121/85    Weight from Last 3 Encounters:   08/15/17 45.4 kg (100 lb)              Today, you had the following     No orders found for display       Primary Care Provider    No Ref-Primary Verified       No address on file        Equal Access to Services     [facility]: Hadii aad ku hadasho Soomaali, waaxda luqadaha, qaybta kaalmada adeamolda, tiffanie isain pon adeumu guzmán ladebialexandra . So [facility] [phone].    ATENCIÓN: Si habla español, tiene a garrido disposición servicios gratuitos de asistencia lingüística. Karriame al [phone].    We comply with applicable federal civil rights laws and [place] laws. We do not discriminate on the basis of race, color, national origin, age, disability sex, sexual orientation or gender identity.            Thank you!     Thank you for choosing EYE CLINIC  for your care. Our goal is always to provide you with excellent care. Hearing back from our patients is one way we can continue to improve our services. Please take a few minutes to complete the written survey that you may receive in the mail after your visit with us. Thank you!             Your Updated Medication List - Protect others around you: Learn how to safely use, store and throw away your medicines at www.disposemymeds.org.          This list is accurate as of: 9/13/17  2:40 PM.  Always use your most recent med list.                   Brand Name Dispense Instructions for use Diagnosis    acetaminophen 32 mg/mL solution    TYLENOL    120 mL    Take 10.15 mLs (325 mg) by mouth every 4 hours as needed for fever or mild pain    S/P eye surgery       atropine 1 % ophthalmic solution     1 Bottle    Place 1 drop into the right eye 2 times daily    Secondary iridocyclitis, noninfectious, right       BACLOFEN PO      Take by mouth 4 times daily        CLONIDINE HCL PO      Take by mouth 2 times daily         diazepam 1 MG/ML solution    VALIUM     Take by mouth every 8 hours as needed for anxiety Take 3 drops as needed for seizure if longer than 2-3 minutes        GLYCOPYRROLATE PO      Take 1 mg by mouth 3 times daily        KEPPRA 100 MG/ML solution   Generic drug:  levETIRAcetam      20ML TWICE DAILY        lacosamide 10 MG/ML Soln solution    VIMPAT     Take 200 mg by mouth 2 times daily        LORazepam 0.5 MG tablet    ATIVAN     Take by mouth as needed        neomycin-polymyxin-dexamethasone 3.5-42370-5.1 Oint ophthalmic ointment    MAXITROL    1 Tube    Place 1 Application into the right eye At Bedtime    Postoperative eye state       ofloxacin 0.3 % ophthalmic solution    OCUFLOX    5 mL    Apply 1 drop to eye 4 times daily Instill into operative eye(s) per physician instructions.    S/P eye surgery       * prednisoLONE acetate 1 % ophthalmic susp    PRED FORTE    15 Bottle    Place 1 drop into the right eye 4 times daily    Secondary iridocyclitis, noninfectious, right       * prednisoLONE acetate 1 % ophthalmic susp    PRED FORTE    5 mL    Apply 1 drop to eye 4 times daily Instill into operative eye(s) per physician instructions.    S/P eye surgery       * prednisoLONE acetate 1 % ophthalmic susp    PRED FORTE    1 Bottle    Place 1 drop into the right eye every 4 hours (while awake)    Postoperative eye state       tiZANidine 4 MG capsule    ZANAFLEX     Take by mouth 3 times daily        * Notice:  This list has 3 medication(s) that are the same as other medications prescribed for you. Read the directions carefully, and ask your doctor or other care provider to review them with you.

## 2017-09-13 NOTE — NURSING NOTE
Chief Complaints and History of Present Illnesses   Patient presents with     Post Op (Ophthalmology) Right Eye     s/p 23g pars plana vitrectomy (PPV)/ endolaser/ intraocular lens removal and capsular tension ring removal right eye 8.15.17     HPI    Affected eye(s):  Right   Symptoms:        Duration:  2 weeks         Comments:  No problems or changes noticed.  Jim Trinidad @ [facility] 2:24 PM September 13, 2017

## 2017-09-13 NOTE — PROGRESS NOTES
status post 23g Pars plana vitrectomy (PPV)/ endolaser/ intraocular lens removal and capsular tension ring removal right eye right eye 8.15.17  Patient with chronic Retinal detachment  And dislocated intraocular lens   Intra-op findings: vitreous hemorrhage,total Retinal detachment with the inferior retina dragged anteriorly and superiorly over the superior retina where the anterior retina was attached to each other. The choroid was visualized inferiorly and the inferior retina was avulsed from the optic nerve head and only a small part of the superior nasal retina was attached. 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 The retinal detachment was inoperable.     K edema improving  mild K edema and resolved hyphema  No eye pain  Mother states his behavior has improved without  Touching his eye or hitting his head. No eye pain   Intraocular pressure good    Plan:  Position: as desidres  Glasses at all times  Avoid touching the eye  Retina detachment and endophthalmitis precautions were discussed with the patient and was asked to return if any of the those occur    Medications to operative eye  Predforte  Three times a day and slow tapering   Ofloxacin four times a day -- stop  Maxitrol oint at bedtime-- stop  Atropine once a day -- stop    Follow up in 4 weeks with refraction   The patient was told to wear polycarbonte glasses at all times to protect the good eye.  he expressed understanding.      ~~~~~~~~~~~~~~~~~~~~~~~~~~~~~~~~~~   Complete documentation of historical and exam elements from today's encounter can be found in the full encounter summary report (not reduplicated in this progress note).  I personally obtained the chief complaint(s) and history of present illness.  I confirmed and edited as necessary the review of systems, past medical/surgical history, family history, social history, and examination findings as documented by others; and I  examined the patient myself.  I personally reviewed the relevant tests, images, and reports as documented above.  I formulated and edited as necessary the assessment and plan and discussed the findings and management plan with the patient and family    Laney Bardales MD  .  Retina Service   Department of Ophthalmology and Visual Neurosciences   [facility]  Phone: [phone]   Fax: [phone]

## 2017-10-12 ENCOUNTER — OFFICE VISIT (OUTPATIENT)
Dept: OPHTHALMOLOGY | Facility: CLINIC | Age: 32
End: 2017-10-12
Attending: OPHTHALMOLOGY
Payer: MEDICAID

## 2017-10-12 DIAGNOSIS — Z98.890 S/P EYE SURGERY: ICD-10-CM

## 2017-10-12 DIAGNOSIS — Z48.810 AFTERCARE FOLLOWING SURGERY OF A SENSORY ORGAN: Primary | ICD-10-CM

## 2017-10-12 PROCEDURE — 99214 OFFICE O/P EST MOD 30 MIN: CPT | Mod: ZF

## 2017-10-12 RX ORDER — PREDNISOLONE ACETATE 10 MG/ML
1 SUSPENSION/ DROPS OPHTHALMIC 2 TIMES DAILY
Qty: 5 ML | Refills: 11 | Status: SHIPPED | OUTPATIENT
Start: 2017-10-12 | End: 2022-11-01

## 2017-10-12 ASSESSMENT — VISUAL ACUITY
OS_SC: UNABLE
METHOD: SNELLEN - LINEAR
OD_SC: UNABLE

## 2017-10-12 ASSESSMENT — EXTERNAL EXAM - RIGHT EYE: OD_EXAM: NORMAL

## 2017-10-12 ASSESSMENT — TONOMETRY
IOP_METHOD: ICARE
IOP_UNABLETOASSESS: 1

## 2017-10-12 ASSESSMENT — SLIT LAMP EXAM - LIDS
COMMENTS: NORMAL
COMMENTS: NORMAL

## 2017-10-12 ASSESSMENT — EXTERNAL EXAM - LEFT EYE: OS_EXAM: NORMAL

## 2017-10-12 NOTE — NURSING NOTE
Chief Complaints and History of Present Illnesses   Patient presents with     Follow Up For     23g Pars plana vitrectomy (PPV)/ endolaser/ intraocular lens removal and capsular tension ring removal right eye right eye 8.15.17     HPI    Last Eye Exam:  9/13/17   Affected eye(s):  Both   Symptoms:     No blurred vision   No decreased vision   Redness   Tearing         Do you have eye pain now?:  No      Comments:  Patient is non verbal. Mom expresses no concerns.     Kyara Knott October 12, 2017 OTS 2:20 PM    Lynsey RICARDO 2:40 PM October 12, 2017

## 2017-10-12 NOTE — MR AVS SNAPSHOT
After Visit Summary   10/12/2017    Jerry Ingram    MRN: [id number]           Patient Information     Date Of Birth          1985        Visit Information        Provider Department      10/12/2017 1:00 PM Laney Bardales MD [facility]        Today's Diagnoses     Aftercare following surgery of a sensory organ    -  1    S/P eye surgery           Follow-ups after your visit        Follow-up notes from your care team     Return in about 6 months (around 2018).      Who to contact     Please call your clinic at [phone] to:    Ask questions about your health    Make or cancel appointments    Discuss your medicines    Learn about your test results    Speak to your doctor   If you have compliments or concerns about an experience at your clinic, or if you wish to file a complaint, please contact [facility] Patient Relations at [phone] or email us at [email or web address]         Additional Information About Your Visit        MyChart Information     [employer] is an electronic gateway that provides easy, online access to your medical records. With [employer], you can request a clinic appointment, read your test results, renew a prescription or communicate with your care team.     To sign up for [employer] visit the website at [email or web address]   You will be asked to enter the access code listed below, as well as some personal information. Please follow the directions to create your username and password.     Your access code is: [email or web address]  Expires: 10/18/2017  6:30 AM     Your access code will  in 90 days. If you need help or a new code, please contact your [facility] or call [phone] for assistance.        Care EveryWhere ID     This is your Care EveryWhere ID. This could be used by other organizations to access your [place] medical records  [id number]         Blood Pressure from Last 3  Encounters:   08/15/17 (P) 109/74   08/26/11 112/72   03/01/11 121/85    Weight from Last 3 Encounters:   08/15/17 45.4 kg (100 lb)              Today, you had the following     No orders found for display         Today's Medication Changes          These changes are accurate as of: 10/12/17  4:39 PM.  If you have any questions, ask your nurse or doctor.               These medicines have changed or have updated prescriptions.        Dose/Directions    * prednisoLONE acetate 1 % ophthalmic susp   Commonly known as:  PRED FORTE   This may have changed:  Another medication with the same name was changed. Make sure you understand how and when to take each.   Used for:  Secondary iridocyclitis, noninfectious, right   Changed by:  Anna Joyner MD        Dose:  1 drop   Place 1 drop into the right eye 4 times daily   Quantity:  15 Bottle   Refills:  3       * prednisoLONE acetate 1 % ophthalmic susp   Commonly known as:  PRED FORTE   This may have changed:  Another medication with the same name was changed. Make sure you understand how and when to take each.   Used for:  Postoperative eye state   Changed by:  Laney Bardales MD        Dose:  1 drop   Place 1 drop into the right eye every 4 hours (while awake)   Quantity:  1 Bottle   Refills:  0       * prednisoLONE acetate 1 % ophthalmic susp   Commonly known as:  PRED FORTE   This may have changed:  when to take this   Used for:  S/P eye surgery   Changed by:  Laney Bardales MD        Dose:  1 drop   Apply 1 drop to eye 2 times daily Instill into operative eye(s) per physician instructions.   Quantity:  5 mL   Refills:  11       * Notice:  This list has 3 medication(s) that are the same as other medications prescribed for you. Read the directions carefully, and ask your doctor or other care provider to review them with you.         Where to get your medicines      These medications were sent to [facility] [address] [place] [address] [address] AT [facility] [address]  [address] [place] [address]     Phone:  [phone]     prednisoLONE acetate 1 % ophthalmic susp                Primary Care Provider    None Specified       No primary provider on file.        Equal Access to Services     ROME BAKER AH: Hadii aad ku hadyano Soomaali, waaxda luqadaha, qaybta kaalmada adeegyada, waxbabs vicente poalexandra rosenvirginiaemma lopez. So [facility] [phone].    ATENCIÓN: Si habla español, tiene a garrido disposición servicios gratuitos de asistencia lingüística. Llame al [phone].    We comply with applicable federal civil rights laws and [place] laws. We do not discriminate on the basis of race, color, national origin, age, disability, sex, sexual orientation, or gender identity.            Thank you!     Thank you for choosing [facility]INIC  for your care. Our goal is always to provide you with excellent care. Hearing back from our patients is one way we can continue to improve our services. Please take a few minutes to complete the written survey that you may receive in the mail after your visit with us. Thank you!             Your Updated Medication List - Protect others around you: Learn how to safely use, store and throw away your medicines at www.disposemymeds.org.          This list is accurate as of: 10/12/17  4:39 PM.  Always use your most recent med list.                   Brand Name Dispense Instructions for use Diagnosis    acetaminophen 32 mg/mL solution    TYLENOL    120 mL    Take 10.15 mLs (325 mg) by mouth every 4 hours as needed for fever or mild pain    S/P eye surgery       atropine 1 % ophthalmic solution     1 Bottle    Place 1 drop into the right eye 2 times daily    Secondary iridocyclitis, noninfectious, right       BACLOFEN PO      Take by mouth 4 times daily        CLONIDINE HCL PO      Take by mouth 2 times daily        diazepam 1 MG/ML solution    VALIUM     Take by mouth every 8 hours as needed for anxiety Take  3 drops as needed for seizure if longer than 2-3 minutes        GLYCOPYRROLATE PO      Take 1 mg by mouth 3 times daily        KEPPRA 100 MG/ML solution   Generic drug:  levETIRAcetam      20ML TWICE DAILY        lacosamide 10 MG/ML Soln solution    VIMPAT     Take 200 mg by mouth 2 times daily        LORazepam 0.5 MG tablet    ATIVAN     Take by mouth as needed        neomycin-polymyxin-dexamethasone 3.5-10792-0.1 Oint ophthalmic ointment    MAXITROL    1 Tube    Place 1 Application into the right eye At Bedtime    Postoperative eye state       ofloxacin 0.3 % ophthalmic solution    OCUFLOX    5 mL    Apply 1 drop to eye 4 times daily Instill into operative eye(s) per physician instructions.    S/P eye surgery       * prednisoLONE acetate 1 % ophthalmic susp    PRED FORTE    15 Bottle    Place 1 drop into the right eye 4 times daily    Secondary iridocyclitis, noninfectious, right       * prednisoLONE acetate 1 % ophthalmic susp    PRED FORTE    1 Bottle    Place 1 drop into the right eye every 4 hours (while awake)    Postoperative eye state       * prednisoLONE acetate 1 % ophthalmic susp    PRED FORTE    5 mL    Apply 1 drop to eye 2 times daily Instill into operative eye(s) per physician instructions.    S/P eye surgery       tiZANidine 4 MG capsule    ZANAFLEX     Take by mouth 3 times daily        * Notice:  This list has 3 medication(s) that are the same as other medications prescribed for you. Read the directions carefully, and ask your doctor or other care provider to review them with you.

## 2017-10-12 NOTE — PROGRESS NOTES
status post 23g Pars plana vitrectomy (PPV)/ endolaser/ intraocular lens removal and capsular tension ring removal right eye right eye 8.15.17  Patient with chronic Retinal detachment  And dislocated intraocular lens   Intra-op findings: vitreous hemorrhage,total Retinal detachment with the inferior retina dragged anteriorly and superiorly over the superior retina where the anterior retina was attached to each other. The choroid was visualized inferiorly and the inferior retina was avulsed from the optic nerve head and only a small part of the superior nasal retina was attached. 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 The retinal detachment was inoperable.     K edema resolved   resolved hyphema  Fundus exam: total Retinal detachment    No eye pain  Mother states his behavior has improved without  Touching his eye or hitting his head. No eye pain. Occasional mild redness right eye   Intraocular pressure good    Plan:  Position: as desires  Glasses at all times  Avoid touching the eye  Retina detachment and endophthalmitis precautions were discussed with the patient and was asked to return if any of the those occur    Medications to operative eye  Predforte  Three times a day and slow tapering     Follow up in 6 months with refraction; sooner as needed   The patient's mom was told to wear polycarbonte glasses at all times to protect the good eye.  he expressed understanding.      ~~~~~~~~~~~~~~~~~~~~~~~~~~~~~~~~~~   Complete documentation of historical and exam elements from today's encounter can be found in the full encounter summary report (not reduplicated in this progress note).  I personally obtained the chief complaint(s) and history of present illness.  I confirmed and edited as necessary the review of systems, past medical/surgical history, family history, social history, and examination findings as documented by others; and I examined the  patient myself.  I personally reviewed the relevant tests, images, and reports as documented above.  I formulated and edited as necessary the assessment and plan and discussed the findings and management plan with the patient and family    Laney Bardales MD  .  Retina Service   Department of Ophthalmology and Visual Neurosciences   [facility]  Phone: [phone]   Fax: [phone]

## 2018-03-07 ENCOUNTER — OFFICE VISIT (OUTPATIENT)
Dept: OPHTHALMOLOGY | Facility: CLINIC | Age: 33
End: 2018-03-07
Attending: OPHTHALMOLOGY
Payer: MEDICAID

## 2018-03-07 DIAGNOSIS — Z98.890 S/P EYE SURGERY: Primary | ICD-10-CM

## 2018-03-07 DIAGNOSIS — H33.21 RETINAL DETACHMENT, RIGHT: ICD-10-CM

## 2018-03-07 PROCEDURE — G0463 HOSPITAL OUTPT CLINIC VISIT: HCPCS | Mod: ZF

## 2018-03-07 ASSESSMENT — VISUAL ACUITY
METHOD: SNELLEN - LINEAR
OD_SC: UNABLE
OS_SC: UNABLE

## 2018-03-07 ASSESSMENT — EXTERNAL EXAM - RIGHT EYE: OD_EXAM: NORMAL

## 2018-03-07 ASSESSMENT — CONF VISUAL FIELD: COMMENTS: UNABLE

## 2018-03-07 ASSESSMENT — SLIT LAMP EXAM - LIDS
COMMENTS: NORMAL
COMMENTS: NORMAL

## 2018-03-07 ASSESSMENT — TONOMETRY
OS_IOP_MMHG: 08
IOP_METHOD: TONOPEN
OD_IOP_MMHG: 04

## 2018-03-07 ASSESSMENT — EXTERNAL EXAM - LEFT EYE: OS_EXAM: NORMAL

## 2018-03-07 NOTE — PROGRESS NOTES
CC: follow up retina surgery right eye   HPI: status post 23g Pars plana vitrectomy (PPV)/ endolaser/ intraocular lens removal and capsular tension ring removal right eye 8.15.17  Patient with chronic Retinal detachment  And dislocated intraocular lens   Intra-op findings: vitreous hemorrhage,total Retinal detachment with the inferior retina dragged anteriorly and superiorly over the superior retina where the anterior retina was attached to each other. The choroid was visualized inferiorly and the inferior retina was avulsed from the optic nerve head and only a small part of the superior nasal retina was attached. 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 The retinal detachment was inoperable.     Interim: mother states Jerry's eye has been bother him and are red    Diffuse cornea opacity; 2 loose cornea sutures; mild conjunctiva redness  Fundus exam: limited view, total Retinal detachment      Plan:  Recommend exam under anesthesia with dilated exam both eyes; possible ultrasound right eye  and removal of cornea suture right eye   Glasses at all times  Avoid touching the eye  Retina detachment and endophthalmitis precautions were discussed with the patient and was asked to return if any of the those occur    The patient's mom was told to wear polycarbonte glasses at all times to protect the good eye. She expressed that he is unable to do so and that he would remove the glasses and not allow them to be put on. Discussed that it is best to use the glasses as protection, but she understands the risks of not wearing the glasses and accepts that risk to the good eye.     Tex Padilla MD  Ophthalmology, PGY-3  ~~~~~~~~~~~~~~~~~~~~~~~~~~~~~~~~~~   Complete documentation of historical and exam elements from today's encounter can be found in the full encounter summary report (not reduplicated in this progress note).  I personally obtained the chief  complaint(s) and history of present illness.  I confirmed and edited as necessary the review of systems, past medical/surgical history, family history, social history, and examination findings as documented by others; and I examined the patient myself.  I personally reviewed the relevant tests, images, and reports as documented above.  I personally reviewed the ophthalmic test(s) associated with this encounter, agree with the interpretation(s) as documented by the resident/fellow, and have edited the corresponding report(s) as necessary.   I formulated and edited as necessary the assessment and plan and discussed the findings and management plan with the patient and family    Laney Bardales MD  .  Retina Service   Department of Ophthalmology and Visual Neurosciences   [facility]  Phone: [phone]   Fax: [phone]

## 2018-03-07 NOTE — NURSING NOTE
Chief Complaints and History of Present Illnesses   Patient presents with     Post Op (Ophthalmology) Right Eye      23g Pars plana vitrectomy (PPV)/ endolaser/ intraocular lens removal and capsular tension ring removal right eye right eye 8.15.17     HPI    Affected eye(s):  Both   Symptoms:        Frequency:  Constant       Do you have eye pain now?:  No      Comments:  Unsure if painful unable to respond  Unable to wear glasses  Prednisolone bid CLARISSA Fry COA 2:40 PM March 7, 2018

## 2018-03-07 NOTE — MR AVS SNAPSHOT
After Visit Summary   3/7/2018    Jerry Ingram    MRN: [id number]           Patient Information     Date Of Birth          1985        Visit Information        Provider Department      3/7/2018 2:00 PM Laney Bardales MD [facility]        Today's Diagnoses     S/P eye surgery - Right Eye    -  1    Retinal detachment, right - Right Eye           Follow-ups after your visit        Follow-up notes from your care team     Return in about 1 year (around 3/7/2019) for Follow Up.      Who to contact     Please call your clinic at [phone] to:    Ask questions about your health    Make or cancel appointments    Discuss your medicines    Learn about your test results    Speak to your doctor            Additional Information About Your Visit        MyChart Information     [employer] is an electronic gateway that provides easy, online access to your medical records. With [employer], you can request a clinic appointment, read your test results, renew a prescription or communicate with your care team.     To sign up for [employer] visit the website at [email or web address]   You will be asked to enter the access code listed below, as well as some personal information. Please follow the directions to create your username and password.     Your access code is: [email or web address]  Expires: 2018  6:30 AM     Your access code will  in 90 days. If you need help or a new code, please contact your [facility] or call [phone] for assistance.        Care EveryWhere ID     This is your Care EveryWhere ID. This could be used by other organizations to access your [place] medical records  [id number]         Blood Pressure from Last 3 Encounters:   08/15/17 (P) 109/74   11 112/72   11 121/85    Weight from Last 3 Encounters:   08/15/17 45.4 kg (100 lb)              We Performed the Following     Huong-Operative Worksheet (Retina)        Primary Care Provider     None Specified       No primary provider on file.        Equal Access to Services     [facility] Center[facility]EREMY : Hadii aad ku hadyanandrew Osullivan, waranulfoblayne paltt, elmotiffanie huddleston. So [facility] [phone].    ATENCIÓN: Si habla español, tiene a garrido disposición servicios gratuitos de asistencia lingüística. [facility] [phone].    We comply with applicable federal civil rights laws and [place] laws. We do not discriminate on the basis of race, color, national origin, age, disability, sex, sexual orientation, or gender identity.            Thank you!     Thank you for choosing [facility]  for your care. Our goal is always to provide you with excellent care. Hearing back from our patients is one way we can continue to improve our services. Please take a few minutes to complete the written survey that you may receive in the mail after your visit with us. Thank you!             Your Updated Medication List - Protect others around you: Learn how to safely use, store and throw away your medicines at www.disposemymeds.org.          This list is accurate as of 3/7/18  5:22 PM.  Always use your most recent med list.                   Brand Name Dispense Instructions for use Diagnosis    acetaminophen 32 mg/mL solution    TYLENOL    120 mL    Take 10.15 mLs (325 mg) by mouth every 4 hours as needed for fever or mild pain    S/P eye surgery       atropine 1 % ophthalmic solution     1 Bottle    Place 1 drop into the right eye 2 times daily    Secondary iridocyclitis, noninfectious, right       BACLOFEN PO      Take by mouth 4 times daily        CLONIDINE HCL PO      Take by mouth 2 times daily        diazepam 1 MG/ML solution    VALIUM     Take by mouth every 8 hours as needed for anxiety Take 3 drops as needed for seizure if longer than 2-3 minutes        GLYCOPYRROLATE PO      Take 1 mg by mouth 3 times daily        KEPPRA 100 MG/ML solution   Generic drug:  levETIRAcetam      20ML TWICE  DAILY        lacosamide 10 MG/ML Soln solution    VIMPAT     Take 200 mg by mouth 2 times daily        LORazepam 0.5 MG tablet    ATIVAN     Take by mouth as needed        neomycin-polymyxin-dexamethasone 3.5-77500-4.1 Oint ophthalmic ointment    MAXITROL    1 Tube    Place 1 Application into the right eye At Bedtime    Postoperative eye state       ofloxacin 0.3 % ophthalmic solution    OCUFLOX    5 mL    Apply 1 drop to eye 4 times daily Instill into operative eye(s) per physician instructions.    S/P eye surgery       * prednisoLONE acetate 1 % ophthalmic susp    PRED FORTE    15 Bottle    Place 1 drop into the right eye 4 times daily    Secondary iridocyclitis, noninfectious, right       * prednisoLONE acetate 1 % ophthalmic susp    PRED FORTE    1 Bottle    Place 1 drop into the right eye every 4 hours (while awake)    Postoperative eye state       * prednisoLONE acetate 1 % ophthalmic susp    PRED FORTE    5 mL    Apply 1 drop to eye 2 times daily Instill into operative eye(s) per physician instructions.    S/P eye surgery       tiZANidine 4 MG capsule    ZANAFLEX     Take by mouth 3 times daily        * Notice:  This list has 3 medication(s) that are the same as other medications prescribed for you. Read the directions carefully, and ask your doctor or other care provider to review them with you.

## 2018-03-12 ENCOUNTER — TELEPHONE (OUTPATIENT)
Dept: OPHTHALMOLOGY | Facility: CLINIC | Age: 33
End: 2018-03-12

## 2018-03-13 DIAGNOSIS — H20.041 SECONDARY IRIDOCYCLITIS, NONINFECTIOUS, RIGHT: ICD-10-CM

## 2018-03-13 RX ORDER — PREDNISOLONE ACETATE 10 MG/ML
1 SUSPENSION/ DROPS OPHTHALMIC 4 TIMES DAILY
Qty: 15 BOTTLE | Refills: 3 | OUTPATIENT
Start: 2018-03-13

## 2018-03-31 NOTE — TELEPHONE ENCOUNTER
Pharmacy states they have no record of the order from October or that they were called again with it on 3-13-18.    Verbal order was given today to Thania.    Kathleen M Doege RN

## 2018-04-11 ENCOUNTER — TELEPHONE (OUTPATIENT)
Dept: OPHTHALMOLOGY | Facility: CLINIC | Age: 33
End: 2018-04-11

## 2018-04-19 ENCOUNTER — TRANSFERRED RECORDS (OUTPATIENT)
Dept: HEALTH INFORMATION MANAGEMENT | Facility: CLINIC | Age: 33
End: 2018-04-19

## 2018-05-23 ENCOUNTER — OFFICE VISIT (OUTPATIENT)
Dept: OPHTHALMOLOGY | Facility: CLINIC | Age: 33
End: 2018-05-23
Attending: OPHTHALMOLOGY
Payer: MEDICAID

## 2018-05-23 DIAGNOSIS — Z98.890 S/P EYE SURGERY: Primary | ICD-10-CM

## 2018-05-23 PROCEDURE — G0463 HOSPITAL OUTPT CLINIC VISIT: HCPCS | Mod: ZF

## 2018-05-23 RX ORDER — NEOMYCIN SULFATE, POLYMYXIN B SULFATE, AND DEXAMETHASONE 3.5; 10000; 1 MG/G; [USP'U]/G; MG/G
1 OINTMENT OPHTHALMIC 2 TIMES DAILY
Qty: 1 TUBE | Refills: 11 | Status: SHIPPED | OUTPATIENT
Start: 2018-05-23 | End: 2020-08-24

## 2018-05-23 ASSESSMENT — TONOMETRY
IOP_METHOD: TONOPEN
OS_IOP_MMHG: 19
OD_IOP_MMHG: 14

## 2018-05-23 ASSESSMENT — VISUAL ACUITY
OS_SC: UNABLE
OD_SC: UNABLE
METHOD: SNELLEN - LINEAR

## 2018-05-23 ASSESSMENT — EXTERNAL EXAM - LEFT EYE: OS_EXAM: NORMAL

## 2018-05-23 ASSESSMENT — SLIT LAMP EXAM - LIDS
COMMENTS: NORMAL
COMMENTS: NORMAL

## 2018-05-23 ASSESSMENT — EXTERNAL EXAM - RIGHT EYE: OD_EXAM: NORMAL

## 2018-05-23 ASSESSMENT — CONF VISUAL FIELD: COMMENTS: UNABLE

## 2018-05-23 NOTE — PROGRESS NOTES
CC: follow up EUA both eyes   HPI: status post 23g Pars plana vitrectomy (PPV)/ endolaser/ intraocular lens removal and capsular tension ring removal right eye 8.15.17  Patient with chronic Retinal detachment  And dislocated intraocular lens   Intra-op findings: vitreous hemorrhage,total Retinal detachment with the inferior retina dragged anteriorly and superiorly over the superior retina where the anterior retina was attached to each other. The choroid was visualized inferiorly and the inferior retina was avulsed from the optic nerve head and only a small part of the superior nasal retina was attached. 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 The retinal detachment was inoperable.     Interim: mother states Jerry's eye has been bother him and are red    Diffuse cornea opacity; EUA was recently done and loose sutures were removed  Still with eye irritation and injection per mom       Plan:  Start maxitrol bid right eye   Glasses at all times  Avoid touching the eye     The patient's mom was told to wear polycarbonte glasses at all times to protect the good eye. She expressed that he is unable to do so and that he would remove the glasses and not allow them to be put on.     Recheck in 3 months     Hernesto Dumont MD, PhD  Vitreoretinal Surgery Fellow    ~~~~~~~~~~~~~~~~~~~~~~~~~~~~~~~~~~   Complete documentation of historical and exam elements from today's encounter can be found in the full encounter summary report (not reduplicated in this progress note).  I personally obtained the chief complaint(s) and history of present illness.  I confirmed and edited as necessary the review of systems, past medical/surgical history, family history, social history, and examination findings as documented by others; and I examined the patient myself.  I personally reviewed the relevant tests, images, and reports as documented above.  I personally reviewed the  ophthalmic test(s) associated with this encounter, agree with the interpretation(s) as documented by the resident/fellow, and have edited the corresponding report(s) as necessary.   I formulated and edited as necessary the assessment and plan and discussed the findings and management plan with the patient and family    Laney Bardales MD  .  Retina Service   Department of Ophthalmology and Visual Neurosciences   [facility]  Phone: [phone]   Fax: [phone]

## 2018-05-23 NOTE — NURSING NOTE
Chief Complaints and History of Present Illnesses   Patient presents with     Follow Up For     2 month follow up status post 23g Pars plana vitrectomy (PPV)/ endolaser/ intraocular lens removal and capsular tension ring removal right eye 8.15.17     HPI    Affected eye(s):  Both   Symptoms:           Do you have eye pain now?:  No      Comments:  Per mom, redness in RE since surgery, uncertain if it has changed.    Negin MCALLISTER May 23, 2018 11:28 AM

## 2018-05-23 NOTE — MR AVS SNAPSHOT
After Visit Summary   5/23/2018    Jerry Ingram    MRN: [id number]           Patient Information     Date Of Birth          1985        Visit Information        Provider Department      5/23/2018 11:15 AM Laney Bardales MD [facility]        Today's Diagnoses     S/P eye surgery - Right Eye    -  1       Follow-ups after your visit        Follow-up notes from your care team     Return in about 3 months (around 8/23/2018) for DFE.      Your next 10 appointments already scheduled     Aug 22, 2018  1:00 PM CDT   RETURN RETINA with Laney Bardales MD   Eye Clinic ([facility])    [address]  9[address] Clin 9a  [facility] [address] [phone]              Who to contact     Please call your clinic at [phone] to:    Ask questions about your health    Make or cancel appointments    Discuss your medicines    Learn about your test results    Speak to your doctor            Additional Information About Your Visit        Care EveryWhere ID     This is your Care EveryWhere ID. This could be used by other organizations to access your [place] medical records  [id number]         Blood Pressure from Last 3 Encounters:   08/15/17 (P) 109/74   08/26/11 112/72   03/01/11 121/85    Weight from Last 3 Encounters:   08/15/17 45.4 kg (100 lb)              Today, you had the following     No orders found for display         Today's Medication Changes          These changes are accurate as of 5/23/18  2:25 PM.  If you have any questions, ask your nurse or doctor.               These medicines have changed or have updated prescriptions.        Dose/Directions    * neomycin-polymyxin-dexamethasone 3.5-96932-9.1 Oint ophthalmic ointment   Commonly known as:  MAXITROL   This may have changed:  Another medication with the same name was added. Make sure you understand how and when to take each.   Used for:  Postoperative eye state   Changed by:   Laney Bardales MD        Dose:  1 Application   Place 1 Application into the right eye At Bedtime   Quantity:  1 Tube   Refills:  1       * neomycin-polymyxin-dexamethasone 3.5-46780-7.1 Oint ophthalmic ointment   Commonly known as:  MAXITROL   This may have changed:  You were already taking a medication with the same name, and this prescription was added. Make sure you understand how and when to take each.   Used for:  S/P eye surgery   Changed by:  Laney Bardales MD        Dose:  1 Application   Place 1 Application into the right eye 2 times daily   Quantity:  1 Tube   Refills:  11       * Notice:  This list has 2 medication(s) that are the same as other medications prescribed for you. Read the directions carefully, and ask your doctor or other care provider to review them with you.         Where to get your medicines      These medications were sent to [facility] [address] [place] [address] [address] AT [facility] OF  [address]  [address] [facility] [address]     Phone:  [phone]     neomycin-polymyxin-dexamethasone 3.5-15844-8.1 Oint ophthalmic ointment                Primary Care Provider    None Specified       No primary provider on file.        Equal Access to Services     ROME BAKER : Ashwin Osullivan, waranulfoda giulia, qaybta kaalmada adelópez, tiffanie lopez. So [facility] [phone].    ATENCIÓN: Si habla español, tiene a garrido disposición servicios gratuitos de asistencia lingüística. Llame al [phone].    We comply with applicable federal civil rights laws and [place] laws. We do not discriminate on the basis of race, color, national origin, age, disability, sex, sexual orientation, or gender identity.            Thank you!     Thank you for choosing EYE CLINIC  for your care. Our goal is always to provide you with excellent care. Hearing back from our patients is one way we can continue to improve our  services. Please take a few minutes to complete the written survey that you may receive in the mail after your visit with us. Thank you!             Your Updated Medication List - Protect others around you: Learn how to safely use, store and throw away your medicines at www.disposemymeds.org.          This list is accurate as of 5/23/18  2:25 PM.  Always use your most recent med list.                   Brand Name Dispense Instructions for use Diagnosis    acetaminophen 32 mg/mL solution    TYLENOL    120 mL    Take 10.15 mLs (325 mg) by mouth every 4 hours as needed for fever or mild pain    S/P eye surgery       atropine 1 % ophthalmic solution     1 Bottle    Place 1 drop into the right eye 2 times daily    Secondary iridocyclitis, noninfectious, right       BACLOFEN PO      Take by mouth 4 times daily        CLONIDINE HCL PO      Take by mouth 2 times daily        diazepam 1 MG/ML solution    VALIUM     Take by mouth every 8 hours as needed for anxiety Take 3 drops as needed for seizure if longer than 2-3 minutes        GLYCOPYRROLATE PO      Take 1 mg by mouth 3 times daily        KEPPRA 100 MG/ML solution   Generic drug:  levETIRAcetam      20ML TWICE DAILY        lacosamide 10 MG/ML Soln solution    VIMPAT     Take 200 mg by mouth 2 times daily        LORazepam 0.5 MG tablet    ATIVAN     Take by mouth as needed        * neomycin-polymyxin-dexamethasone 3.5-50271-0.1 Oint ophthalmic ointment    MAXITROL    1 Tube    Place 1 Application into the right eye At Bedtime    Postoperative eye state       * neomycin-polymyxin-dexamethasone 3.5-30786-9.1 Oint ophthalmic ointment    MAXITROL    1 Tube    Place 1 Application into the right eye 2 times daily    S/P eye surgery       ofloxacin 0.3 % ophthalmic solution    OCUFLOX    5 mL    Apply 1 drop to eye 4 times daily Instill into operative eye(s) per physician instructions.    S/P eye surgery       * prednisoLONE acetate 1 % ophthalmic susp    PRED FORTE    15  Bottle    Place 1 drop into the right eye 4 times daily    Secondary iridocyclitis, noninfectious, right       * prednisoLONE acetate 1 % ophthalmic susp    PRED FORTE    1 Bottle    Place 1 drop into the right eye every 4 hours (while awake)    Postoperative eye state       * prednisoLONE acetate 1 % ophthalmic susp    PRED FORTE    5 mL    Apply 1 drop to eye 2 times daily Instill into operative eye(s) per physician instructions.    S/P eye surgery       tiZANidine 4 MG capsule    ZANAFLEX     Take by mouth 3 times daily        * Notice:  This list has 5 medication(s) that are the same as other medications prescribed for you. Read the directions carefully, and ask your doctor or other care provider to review them with you.

## 2018-09-14 DIAGNOSIS — H20.041 SECONDARY IRIDOCYCLITIS, NONINFECTIOUS, RIGHT: ICD-10-CM

## 2018-09-14 RX ORDER — ATROPINE SULFATE 10 MG/ML
1 SOLUTION/ DROPS OPHTHALMIC 2 TIMES DAILY
Qty: 1 BOTTLE | Refills: 11 | OUTPATIENT
Start: 2018-09-14

## 2018-11-08 ENCOUNTER — OFFICE VISIT (OUTPATIENT)
Dept: OPHTHALMOLOGY | Facility: CLINIC | Age: 33
End: 2018-11-08
Attending: OPHTHALMOLOGY
Payer: MEDICAID

## 2018-11-08 DIAGNOSIS — H33.053: Primary | ICD-10-CM

## 2018-11-08 PROCEDURE — G0463 HOSPITAL OUTPT CLINIC VISIT: HCPCS | Mod: ZF

## 2018-11-08 ASSESSMENT — TONOMETRY
OD_IOP_MMHG: 14
IOP_METHOD: TONOPEN
OS_IOP_MMHG: 17

## 2018-11-08 ASSESSMENT — EXTERNAL EXAM - LEFT EYE: OS_EXAM: NORMAL

## 2018-11-08 ASSESSMENT — SLIT LAMP EXAM - LIDS
COMMENTS: NORMAL
COMMENTS: NORMAL

## 2018-11-08 ASSESSMENT — CONF VISUAL FIELD: COMMENTS: UNABLE

## 2018-11-08 ASSESSMENT — VISUAL ACUITY
OS_SC: UNABLE
OD_SC: UNABLE
METHOD: SNELLEN - LINEAR

## 2018-11-08 ASSESSMENT — EXTERNAL EXAM - RIGHT EYE: OD_EXAM: NORMAL

## 2018-11-08 NOTE — NURSING NOTE
Chief Complaints and History of Present Illnesses   Patient presents with     Follow Up For     retinal exam; s/p 23g PPV/endolaser/intraocular lens removal and capsular tension ring removal right eye (8.15.17)     HPI    Affected eye(s):  Right   Symptoms:     No decreased vision   No floaters   No flashes   Redness (Comment: in the left eye comes and goes x 1 month)      Duration:  6 months   Frequency:  Constant       Do you have eye pain now?:  No      Comments:  Pt here for f/u DFE  Mom notes some redness in the left eye that comes and goes over the past month or so (like the right eye)    Mom reports getting Maxitrol BID in the right eye when pt allows it  Mom reports today is the first day pt is wearing his glasses (usually doesn't tolerate)    Lynsey RICARDO 11:53 AM November 8, 2018

## 2018-11-08 NOTE — PROGRESS NOTES
CC: follow up Retinal detachment right eye    HPI: right eye: status post 23g Pars plana vitrectomy (PPV)/ endolaser/ intraocular lens removal and capsular tension ring removal right eye 8.15.17. Patient with chronic Retinal detachment  And dislocated intraocular lens   Intra-op findings: vitreous hemorrhage,total Retinal detachment with the inferior retina dragged anteriorly and superiorly over the superior retina where the anterior retina was attached to each other. The choroid was visualized inferiorly and the inferior retina was avulsed from the optic nerve head and only a small part of the superior nasal retina was attached. 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 The retinal detachment was inoperable.     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 History of old Retinal detachment right eye   -The retinal detachment was inoperable.   - cornea decompensation  - good intraocular pressure  - per mom, eye occasional with redness and pain  Recommend artificial tears as needed     2- monocular patient. Refractive error left eye   The patient was told to wear polycarbonte glasses at all times to protect the good eye.  he expressed understanding.    Mother states jerry did not tolerate prescription. Currently not using glasses. Occasional goggles    3- flat pigmented choroidal lesion left eye  - poor patient cooperation for optos pic   - choroidal pigment changes captured with Dr. Fernandez's iphone for baseline- will transfer to [facility].  - recommend baseline ultrasound. Mom could not stay today- will obtain next follow up     Plan:    The  patient's mom was told to wear polycarbonte glasses at all times to protect the good eye. She expressed that he is unable to do so and that he would remove the glasses and not allow them to be put on.     Recheck in 3-6 months with prescription; ultrasound both eyes and possible optos (if enough cooperation)        ~~~~~~~~~~~~~~~~~~~~~~~~~~~~~~~~~~   Complete documentation of historical and exam elements from today's encounter can be found in the full encounter summary report (not reduplicated in this progress note).  I personally obtained the chief complaint(s) and history of present illness.  I confirmed and edited as necessary the review of systems, past medical/surgical history, family history, social history, and examination findings as documented by others; and I examined the patient myself.  I personally reviewed the relevant tests, images, and reports as documented above.  I personally reviewed the ophthalmic test(s) associated with this encounter, agree with the interpretation(s) as documented by the resident/fellow, and have edited the corresponding report(s) as necessary.   I formulated and edited as necessary the assessment and plan and discussed the findings and management plan with the patient and family    Laney Bardales MD  .  Retina Service   Department of Ophthalmology and Visual Neurosciences   [facility]  Phone: [phone]   Fax: [phone]

## 2018-11-08 NOTE — MR AVS SNAPSHOT
After Visit Summary   2018    Jerry Ingram    MRN: [id number]           Patient Information     Date Of Birth          1985        Visit Information        Provider Department      2018 12:15 PM Laney Bardales MD [facility]        Today's Diagnoses     Recent retinal detachment of both eyes, total/subtotal    -  1       Follow-ups after your visit        Your next 10 appointments already scheduled     May 08, 2019 12:15 PM CDT   RETURN RETINA with Laney Bardales MD   Eye Clinic ([facility])    [address]  [facility] [address] [place] [address]   [phone]              Who to contact     Please call your clinic at [phone] to:    Ask questions about your health    Make or cancel appointments    Discuss your medicines    Learn about your test results    Speak to your doctor            Additional Information About Your Visit        MyChart Information     [employer] is an electronic gateway that provides easy, online access to your medical records. With [employer], you can request a clinic appointment, read your test results, renew a prescription or communicate with your care team.     To sign up for [employer] visit the website at [email or web address]   You will be asked to enter the access code listed below, as well as some personal information. Please follow the directions to create your username and password.     Your access code is: [email or web address]  Expires: 2019  5:31 AM     Your access code will  in 90 days. If you need help or a new code, please contact your [facility] or call [phone] for assistance.        Care EveryWhere ID     This is your Care EveryWhere ID. This could be used by other organizations to access your [place] medical records  [id number]         Blood Pressure from Last 3 Encounters:   08/15/17 (P) 109/74   11 112/72   11 121/85    Weight  from Last 3 Encounters:   08/15/17 45.4 kg (100 lb)              We Performed the Following     Ultrasound B-scan OU (both eyes)        Primary Care Provider    None Specified       No primary provider on file.        Equal Access to Services     ROME BAKER : Hadii aad ku hadyanandrew Monicajulio, juanitoda brielleerin, silvia kawilly smith, tiffanie fontenotalexandra ruiz esteremma loepz. So [facility] [phone].    ATENCIÓN: Si habla español, tiene a garrido disposición servicios gratuitos de asistencia lingüística. Llame al [phone].    We comply with applicable federal civil rights laws and [place] laws. We do not discriminate on the basis of race, color, national origin, age, disability, sex, sexual orientation, or gender identity.            Thank you!     Thank you for choosing EYE CLINIC  for your care. Our goal is always to provide you with excellent care. Hearing back from our patients is one way we can continue to improve our services. Please take a few minutes to complete the written survey that you may receive in the mail after your visit with us. Thank you!             Your Updated Medication List - Protect others around you: Learn how to safely use, store and throw away your medicines at www.disposemymeds.org.          This list is accurate as of 11/8/18  1:01 PM.  Always use your most recent med list.                   Brand Name Dispense Instructions for use Diagnosis    acetaminophen 32 mg/mL solution    TYLENOL    120 mL    Take 10.15 mLs (325 mg) by mouth every 4 hours as needed for fever or mild pain    S/P eye surgery       atropine 1 % ophthalmic solution     1 Bottle    Place 1 drop into the right eye 2 times daily    Secondary iridocyclitis, noninfectious, right       BACLOFEN PO      Take by mouth 4 times daily        CLONIDINE HCL PO      Take by mouth 2 times daily        diazepam 1 MG/ML solution    VALIUM     Take by mouth every 8 hours as needed for anxiety Take 3 drops as needed for seizure if  longer than 2-3 minutes        GLYCOPYRROLATE PO      Take 1 mg by mouth 3 times daily        KEPPRA 100 MG/ML solution   Generic drug:  levETIRAcetam      20ML TWICE DAILY        lacosamide 10 MG/ML Soln solution    VIMPAT     Take 200 mg by mouth 2 times daily        LORazepam 0.5 MG tablet    ATIVAN     Take by mouth as needed        * neomycin-polymyxin-dexamethasone 3.5-45909-3.1 Oint ophthalmic ointment    MAXITROL    1 Tube    Place 1 Application into the right eye At Bedtime    Postoperative eye state       * neomycin-polymyxin-dexamethasone 3.5-23418-5.1 Oint ophthalmic ointment    MAXITROL    1 Tube    Place 1 Application into the right eye 2 times daily    S/P eye surgery       ofloxacin 0.3 % ophthalmic solution    OCUFLOX    5 mL    Apply 1 drop to eye 4 times daily Instill into operative eye(s) per physician instructions.    S/P eye surgery       * prednisoLONE acetate 1 % ophthalmic susp    PRED FORTE    15 Bottle    Place 1 drop into the right eye 4 times daily    Secondary iridocyclitis, noninfectious, right       * prednisoLONE acetate 1 % ophthalmic susp    PRED FORTE    1 Bottle    Place 1 drop into the right eye every 4 hours (while awake)    Postoperative eye state       * prednisoLONE acetate 1 % ophthalmic susp    PRED FORTE    5 mL    Apply 1 drop to eye 2 times daily Instill into operative eye(s) per physician instructions.    S/P eye surgery       tiZANidine 4 MG capsule    ZANAFLEX     Take by mouth 3 times daily        * Notice:  This list has 5 medication(s) that are the same as other medications prescribed for you. Read the directions carefully, and ask your doctor or other care provider to review them with you.

## 2019-02-26 ENCOUNTER — TELEPHONE (OUTPATIENT)
Dept: OPHTHALMOLOGY | Facility: CLINIC | Age: 34
End: 2019-02-26

## 2019-02-26 NOTE — TELEPHONE ENCOUNTER
Note to Dr. Dumont for further review  Titus Pringle RN 12:10 PM 02/26/19        [facility] Call Center    Phone Message    May a detailed message be left on voicemail: yes    Reason for Call: Other: [facility] is calling to let Dr. Anna Joyner know that the pharmacy is out of prednisolone acetate is unavailable by manufactor right now, the pharmacy offered 2 options. 1-FML 2-dexamchasone, Please call the pharmacy with the option that works. Thank you     Action Taken: Message routed to:  [facility] Eye

## 2019-04-19 DIAGNOSIS — Z98.890 POSTOPERATIVE EYE STATE: ICD-10-CM

## 2019-04-23 RX ORDER — PREDNISOLONE ACETATE 10 MG/ML
1 SUSPENSION/ DROPS OPHTHALMIC
Qty: 1 BOTTLE | Refills: 0 | OUTPATIENT
Start: 2019-04-23

## 2019-05-06 DIAGNOSIS — H33.053 OLD TOTAL RETINAL DETACHMENT OF BOTH EYES: Primary | ICD-10-CM

## 2019-05-07 DIAGNOSIS — Z98.890 POSTOPERATIVE EYE STATE: ICD-10-CM

## 2019-05-07 DIAGNOSIS — Z98.890 S/P EYE SURGERY: ICD-10-CM

## 2019-05-09 NOTE — TELEPHONE ENCOUNTER
Medication: prednisoLONE acetate (PRED FORTE) 1 %    Requested directions:  Place 1 drop into the right eye every 4 hours (while awake)  Current directions on the medication list:  1 drop to eye 2 times daily Instill into operative eye(s) per physician instructions.    Last Written Prescription Date:  10/12/17  Last Fill Quantity: 5ML,   # refills: 11    Last Office Visit:  5/8/19  Future Office visit: NONE     Attending Provider: Laney Bardales MD       Last Clinic Note:        Routing refill request to provider for review/approval because:  Not on medication list / NOTE PLAN   Not on protocol  Requires provider review

## 2019-05-13 RX ORDER — PREDNISOLONE ACETATE 10 MG/ML
1 SUSPENSION/ DROPS OPHTHALMIC
Qty: 1 BOTTLE | Refills: 0 | OUTPATIENT
Start: 2019-05-13

## 2019-05-13 NOTE — TELEPHONE ENCOUNTER
Refill for Pred Forte denied as not mentioned in previous notes. Patient recently no showed for recent appointment. Would recommend scheduling another appointment and medications can be addressed at that time.

## 2019-07-15 ENCOUNTER — TELEPHONE (OUTPATIENT)
Dept: OPHTHALMOLOGY | Facility: CLINIC | Age: 34
End: 2019-07-15

## 2019-07-15 DIAGNOSIS — H20.041 SECONDARY IRIDOCYCLITIS, NONINFECTIOUS, RIGHT: ICD-10-CM

## 2019-07-16 ENCOUNTER — TELEPHONE (OUTPATIENT)
Dept: OPHTHALMOLOGY | Facility: CLINIC | Age: 34
End: 2019-07-16

## 2019-07-16 RX ORDER — PREDNISOLONE ACETATE 10 MG/ML
1 SUSPENSION/ DROPS OPHTHALMIC 4 TIMES DAILY
Qty: 15 BOTTLE | Refills: 3 | OUTPATIENT
Start: 2019-07-16

## 2019-07-16 NOTE — TELEPHONE ENCOUNTER
prednisoLONE acetate (PRED FORTE) 1 % ophthalmic suspension      Last Written Prescription Date:  8-3-17  Last Fill Quantity: 15 bottl3,   # refills: 3  Last Office Visit : 11-8-18  Future Office visit:  none    Refill Denied- pharm called: PARI to Ophth clinic, for scheduling  See epic encounter note: previous request denied.  [id number] attempted to contact patient/patient's caregiver to clarify eye drops d/t request for prednisolone refill. No answer. Given lack of mention in 2 most recent progress notes, refill was denied by Dr MELISSA Valencia and this writer. Pt has f/u w/ Dr Bardales in 12 days at which time drops should be reviewed.      Juan Henriquez MD  Department of Ophthalmology - PGY2    5-7-19  Erik, Flip Chaparro MD         6:01 PM   Note      Refill for Pred Forte denied as not mentioned in previous notes. Patient recently no showed for recent appointment. Would recommend scheduling another appointment and medications can be addressed at that time.

## 2019-07-17 NOTE — TELEPHONE ENCOUNTER
No answer and unable to leave voicemail  Will mail letter to to call direct triage number and discuss scheduling/refill  No alternate number in demographics  Titus Pringle RN 8:43 AM 07/18/19      [phone] (M)  [phone] (H)    Called to review scheduling/refill around 0900 today    No answer and unable to leave voicemail  Titus Pringle RN 10:41 AM 07/17/19

## 2019-08-07 ENCOUNTER — OFFICE VISIT (OUTPATIENT)
Dept: OPHTHALMOLOGY | Facility: CLINIC | Age: 34
End: 2019-08-07
Attending: OPHTHALMOLOGY
Payer: MEDICAID

## 2019-08-07 DIAGNOSIS — H33.053 OLD TOTAL RETINAL DETACHMENT OF BOTH EYES: ICD-10-CM

## 2019-08-07 DIAGNOSIS — Z98.890 POSTOPERATIVE EYE STATE: ICD-10-CM

## 2019-08-07 DIAGNOSIS — H15.832 POSTERIOR STAPHYLOMA, LEFT: Primary | ICD-10-CM

## 2019-08-07 PROCEDURE — 76512 OPH US DX B-SCAN: CPT | Mod: ZF | Performed by: OPHTHALMOLOGY

## 2019-08-07 PROCEDURE — 92250 FUNDUS PHOTOGRAPHY W/I&R: CPT | Mod: ZF | Performed by: OPHTHALMOLOGY

## 2019-08-07 PROCEDURE — G0463 HOSPITAL OUTPT CLINIC VISIT: HCPCS | Mod: ZF

## 2019-08-07 RX ORDER — NEOMYCIN SULFATE, POLYMYXIN B SULFATE, AND DEXAMETHASONE 3.5; 10000; 1 MG/G; [USP'U]/G; MG/G
OINTMENT OPHTHALMIC
Qty: 1 TUBE | Refills: 11 | Status: SHIPPED | OUTPATIENT
Start: 2019-08-07 | End: 2020-08-19

## 2019-08-07 ASSESSMENT — CONF VISUAL FIELD: COMMENTS: UNABLE

## 2019-08-07 ASSESSMENT — VISUAL ACUITY
METHOD: SNELLEN - LINEAR
OS_SC: UNABLE
OD_SC: UNABLE

## 2019-08-07 ASSESSMENT — TONOMETRY
OD_IOP_MMHG: 16
OS_IOP_MMHG: UNAB
IOP_METHOD: TONOPEN

## 2019-08-07 NOTE — NURSING NOTE
Chief Complaints and History of Present Illnesses   Patient presents with     Follow Up     Chief Complaint(s) and History of Present Illness(es)     Follow Up     Laterality: right eye    Associated symptoms: eye pain              Comments     Yellow/green discharge X 2 weeks  pts caregiver states he has pain   Ran out of drops 2 weeks ago needs refills  Paula Fry COA 1:59 PM August 7, 2019

## 2019-08-09 ENCOUNTER — TELEPHONE (OUTPATIENT)
Dept: OPHTHALMOLOGY | Facility: CLINIC | Age: 34
End: 2019-08-09

## 2019-08-09 NOTE — TELEPHONE ENCOUNTER
Spoke to pharmacy-- pharmacy resubmitted under staff Dr. Bardales (Dr. Marks not recognize for MA)  Rx went thru  Titus Pringle RN 5:06 PM 08/09/19        [facility] Call Center    Phone Message    May a detailed message be left on voicemail: no    Reason for Call: Medication Question or concern regarding medication   Prescription Clarification  Name of Medication: neomycin-polymyxin-dexamethasone (MAXITROL) 3.5-24856-9.1 ophthalmic ointment  Prescribing Provider:    [id number] [place], [place] - [facility] [address] AT [facility] OF [address]   What on the order needs clarification? There are a few issues with this RX and the pharmacy is requesting a call back. Main issue is this RX is not covered by pt's insurance and need an alternative medication.          Action Taken: Message routed to:  [facility] eye clinic

## 2020-02-19 ENCOUNTER — TELEPHONE (OUTPATIENT)
Dept: OPHTHALMOLOGY | Facility: CLINIC | Age: 35
End: 2020-02-19

## 2020-02-19 NOTE — TELEPHONE ENCOUNTER
[phone]    Spoke to mother at 0835  Pt been using maxitrol ointment since last visit in august 2019    Reviewed pt was to be using for 5 days per last note and recommended using OTC artificial tears and may use bland lubricating ointment at night    No improvement in symptoms since last visit and symptoms have not worsened per mother    Scheduled f/u appt march 11th with Dr. Bardales and reviewed with mother to call for any new symptoms/complaints    Titus Pringle RN 8:39 AM 02/20/20          [facility] Call [facility]    Phone Message    May a detailed message be left on voicemail: yes     Reason for Call: Medication Question or concern regarding medication   Prescription Clarification  Name of Medication: neomycin-polymyxin-dexamethasone (MAXITROL) 3.5-44966-4.1 ophthalmic ointment  Prescribing Provider: Dr Roger Rodriguez.    [facility]   What on the order needs clarification?     This medicaiton is unavailable.  Pharmacy is calling to see if we could do a Toberdex instead?    Please call back to discuss.             Action Taken: Message routed to:  [facility]    Travel Screening: Not Applicable

## 2020-08-24 DIAGNOSIS — Z98.890 S/P EYE SURGERY: ICD-10-CM

## 2020-08-24 RX ORDER — NEOMYCIN SULFATE, POLYMYXIN B SULFATE, AND DEXAMETHASONE 3.5; 10000; 1 MG/G; [USP'U]/G; MG/G
OINTMENT OPHTHALMIC
Qty: 1 TUBE | Refills: 3 | Status: SHIPPED | OUTPATIENT
Start: 2020-08-24 | End: 2022-11-01

## 2021-03-30 DIAGNOSIS — Z98.890 S/P EYE SURGERY: ICD-10-CM

## 2021-03-30 RX ORDER — NEOMYCIN SULFATE, POLYMYXIN B SULFATE, AND DEXAMETHASONE 3.5; 10000; 1 MG/G; [USP'U]/G; MG/G
OINTMENT OPHTHALMIC
Qty: 3.5 G | OUTPATIENT
Start: 2021-03-30

## 2021-03-30 NOTE — TELEPHONE ENCOUNTER
"Patient recommended to use \"maxitrol oint twice a day x 5 days.\" Therapy complete at this time.  "

## 2021-03-30 NOTE — TELEPHONE ENCOUNTER
Medication: neomycin-polymyxin-dexamethasone (MAXITROL) 3.5-34810-7.1 ophthalmic ointment    Requested directions: Apply to right eye 2 x daily  Current directions on the medication list: Same    Last Written Prescription Date:  8/24/20  Last Fill Quantity: 1 tube,   # refills: 3    Last Office Visit: 8/7/19 recommended 8 month follow up  Future Office visit: none scheduled    Attending Provider: Lucrecia  Last Clinic Note: Assessment      1. History of old Retinal detachment right eye   -The retinal detachment was inoperable.   - cornea decompensation  - good intraocular pressure  - per mom, eye occasional with redness and pain  Recommend artificial tears as needed      2- monocular patient. Refractive error left eye   The patient was told to wear polycarbonte glasses at all times to protect the good eye.  he expressed understanding.    Mother states marti did not tolerate prescription. Currently not using glasses. Occasional goggles     3. 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 conjuntivitis right eye   maxitrol oint twice a day x 5 days     Plan:     The patient's mom was told to wear polycarbonte glasses at all times to protect the good eye. She expressed that he is unable to do so and that he would remove the glasses and not allow them to be put on.      Recheck in 8 months with prescription with optos    Routing refill request to provider for review/approval because:  Not on protocol  Requires provider review

## 2021-04-22 DIAGNOSIS — Z98.890 S/P EYE SURGERY: ICD-10-CM

## 2021-04-22 RX ORDER — NEOMYCIN SULFATE, POLYMYXIN B SULFATE, AND DEXAMETHASONE 3.5; 10000; 1 MG/G; [USP'U]/G; MG/G
OINTMENT OPHTHALMIC
Qty: 3.5 G | OUTPATIENT
Start: 2021-04-22

## 2021-04-22 NOTE — TELEPHONE ENCOUNTER
"Medication: neomycin-polymyxin-dexamethasone (MAXITROL) 3.5-93665-0.1 ophthalmic ointment    Per provider review 3/31/2021: Patient recommended to use \"maxitrol oint twice a day x 5 days.\" Therapy complete at this time.  Appt due for assessment   Requested directions: Apply to right eye 2 x daily  Current directions on the medication list: Same     Last Written Prescription Date:  8/24/20  Last Fill Quantity: 1 tube,   # refills: 3     Last Office Visit: 8/7/19 recommended 8 month follow up  Future Office visit: none scheduled     Attending Provider: Catia  Last Clinic Note: Assessment      1. History of old Retinal detachment right eye   -The retinal detachment was inoperable.   - cornea decompensation  - good intraocular pressure  - per mom, eye occasional with redness and pain  Recommend artificial tears as needed      2- monocular patient. Refractive error left eye   The patient was told to wear polycarbonte glasses at all times to protect the good eye.  he expressed understanding.    Mother states marti did not tolerate prescription. Currently not using glasses. Occasional goggles     3. 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 conjuntivitis right eye   maxitrol oint twice a day x 5 days     Plan:     The patient's mom was told to wear polycarbonte glasses at all times to protect the good eye. She expressed that he is unable to do so and that he would remove the glasses and not allow them to be put on.      Recheck in 8 months with prescription with optos           "

## 2021-05-08 DIAGNOSIS — Z98.890 POSTOPERATIVE EYE STATE: ICD-10-CM

## 2021-05-10 NOTE — TELEPHONE ENCOUNTER
Medication: neomycin-polymyxin-dexamethasone (MAXITROL) 3.5-99936-2.1 ophthalmic ointment    Dx: Postoperative eye state   Requested directions: same  Current directions on the medication list: APPLY 1/2 INCH UNDER THE LOWER EYELID AS DIRECTED    Last Written Prescription Date:  8/23/20  Last Fill Quantity: 3.5 g,   # refills: 3    Last Office Visit: 8/7/19  Future Office visit: none    Attending Provider: Laney Bardales MD  Last Clinic Note: 8/7/19  Recheck in 8 months with prescription with optos    Routing refill request to provider for review/approval because:  Requires provider review/ approval    Scheduling has been notified to contact the pt for appointment.

## 2021-05-21 DIAGNOSIS — Z98.890 POSTOPERATIVE EYE STATE: ICD-10-CM

## 2021-05-21 RX ORDER — NEOMYCIN SULFATE, POLYMYXIN B SULFATE, AND DEXAMETHASONE 3.5; 10000; 1 MG/G; [USP'U]/G; MG/G
OINTMENT OPHTHALMIC
Qty: 3.5 G | Refills: 0 | Status: SHIPPED
Start: 2021-05-21 | End: 2022-11-01

## 2021-05-21 RX ORDER — NEOMYCIN SULFATE, POLYMYXIN B SULFATE, AND DEXAMETHASONE 3.5; 10000; 1 MG/G; [USP'U]/G; MG/G
OINTMENT OPHTHALMIC
Qty: 3.5 G | Refills: 0 | OUTPATIENT
Start: 2021-05-21

## 2021-05-21 NOTE — TELEPHONE ENCOUNTER
neomycin-polymyxin-dexamethasone (MAXITROL) 3.5-92961-8.1 ophthalmic ointment  neomycin-polymyxin-dexamethasone (MAXITROL) 3.5-58451-9.1 ophthalmic ointment 3.5 g 0 5/21/2021  No   Sig: APPLY 1/2 INCH UNDER THE LOWER EYELID AS DIRECTED   Class: Denied   Order: [id number]   Printout Tracking    External Result Report   Medication Administration Instructions    APPLY 1/2 INCH UNDER THE LOWER EYELID AS DIRECTED   Pharmacy    [id number] [place] [facility] [address] AT [facility] OF  [address]   Associated Diagnoses    Postoperative eye state [Z98.890]

## 2021-05-21 NOTE — TELEPHONE ENCOUNTER
Pt scheduled next week with Dr. Bardales    Spoke to mother at 0920    Symptoms/redness times two months    Reviewed would need to see in clinic before restarting drops/ointment with steroid--- lat visit 8-2019    Offered earlier exam than next Thursday and mother prefers to f/u as scheduled next Thursday    Titus Pringle RN 9:23 AM 05/21/21

## 2021-05-27 ENCOUNTER — OFFICE VISIT (OUTPATIENT)
Dept: OPHTHALMOLOGY | Facility: CLINIC | Age: 36
End: 2021-05-27
Attending: OPHTHALMOLOGY
Payer: MEDICAID

## 2021-05-27 DIAGNOSIS — Q14.1 RETINA COLOBOMA, BILATERAL: Primary | ICD-10-CM

## 2021-05-27 DIAGNOSIS — Q14.1 RETINA COLOBOMA, BILATERAL: ICD-10-CM

## 2021-05-27 PROCEDURE — 92012 INTRM OPH EXAM EST PATIENT: CPT | Performed by: OPHTHALMOLOGY

## 2021-05-27 PROCEDURE — G0463 HOSPITAL OUTPT CLINIC VISIT: HCPCS | Mod: 25

## 2021-05-27 PROCEDURE — 92250 FUNDUS PHOTOGRAPHY W/I&R: CPT | Performed by: OPHTHALMOLOGY

## 2021-05-27 RX ORDER — ATROPINE SULFATE 10 MG/ML
1-2 SOLUTION/ DROPS OPHTHALMIC DAILY
Qty: 5 ML | Refills: 11 | Status: SHIPPED | OUTPATIENT
Start: 2021-05-27 | End: 2023-01-11

## 2021-05-27 RX ORDER — PREDNISOLONE ACETATE 10 MG/ML
1 SUSPENSION/ DROPS OPHTHALMIC 2 TIMES DAILY
Qty: 10 ML | Refills: 11 | Status: SHIPPED | OUTPATIENT
Start: 2021-05-27 | End: 2021-05-27

## 2021-05-27 RX ORDER — PREDNISOLONE ACETATE 10 MG/ML
1 SUSPENSION/ DROPS OPHTHALMIC 2 TIMES DAILY
Qty: 10 ML | Refills: 11 | Status: SHIPPED | OUTPATIENT
Start: 2021-05-27 | End: 2023-01-11

## 2021-05-27 RX ORDER — ATROPINE SULFATE 10 MG/ML
1-2 SOLUTION/ DROPS OPHTHALMIC DAILY
Qty: 5 ML | Refills: 11 | Status: SHIPPED | OUTPATIENT
Start: 2021-05-27 | End: 2021-05-27

## 2021-05-27 ASSESSMENT — TONOMETRY
IOP_METHOD: TONOPEN
OS_IOP_MMHG: 19
OD_IOP_MMHG: 14

## 2021-05-27 ASSESSMENT — EXTERNAL EXAM - RIGHT EYE: OD_EXAM: NORMAL

## 2021-05-27 ASSESSMENT — SLIT LAMP EXAM - LIDS
COMMENTS: NORMAL
COMMENTS: NORMAL

## 2021-05-27 ASSESSMENT — CONF VISUAL FIELD: COMMENTS: UNABLE

## 2021-05-27 ASSESSMENT — EXTERNAL EXAM - LEFT EYE: OS_EXAM: NORMAL

## 2021-05-27 ASSESSMENT — VISUAL ACUITY
METHOD: SNELLEN - LINEAR
OD_SC: UNABLE
OS_SC: UNABLE

## 2021-05-27 NOTE — NURSING NOTE
Chief Complaints and History of Present Illnesses   Patient presents with     Follow Up     Chief Complaint(s) and History of Present Illness(es)     Follow Up     Associated symptoms: discharge, redness, swelling and tearing.  Negative for fever    Pain scale: 0/10              Comments     Mom states redness RE X 1 month yellow discharge, and am crusting .  Moms wants to hold on glasses, she states he will not wear them  Mom is using maxitrol oint and gel drop, helps some.      Louise Bennett COT 2:21 PM May 27, 2021

## 2021-05-27 NOTE — PROGRESS NOTES
CC: follow up Retinal detachment right eye ; discharge from right eye    HPI: right eye: status post 23g Pars plana vitrectomy (PPV)/ endolaser/ intraocular lens removal and capsular tension ring removal right eye 8.15.17. Patient with chronic Retinal detachment  And dislocated intraocular lens   Intra-op findings: vitreous hemorrhage,total Retinal detachment with the inferior retina dragged anteriorly and superiorly over the superior retina where the anterior retina was attached to each other. The choroid was visualized inferiorly and the inferior retina was avulsed from the optic nerve head and only a small part of the superior nasal retina was attached. 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 The retinal detachment was inoperable.     Interim: Patient has been having redness and discharge of the right eye for about 1 month. He is usnig Maxitrol ointment and gel drop which help a little. He refuses to wear glasses/goggoles.     optos image 5-27-21   Left eye with posterior staphyloma and temporal peripheral Retinal pigment epithelium changes - possible flat choroidal lesion and peripheral areas of white without pressure     Ultrasound:   Right eye: old Retinal detachment   Left eye: posterior staphyloma and few vitreous debri; no Retinal detachment      Assessment     1. History of old Retinal detachment right eye   -The retinal detachment was inoperable.   - cornea decompensation  - good intraocular pressure  - per mom, eye occasional with redness and pain  Recommend artificial tears as needed   Start atropine qday right eye  Pred forte BID right eye     2- monocular patient. Refractive error left eye   The patient was told to wear polycarbonte glasses at all times to protect the good eye.  he expressed understanding.    Mother states marti did not tolerate prescription. Currently not using glasses. Occasional goggles    3. Peripheral Retinal  pigment epithelium changes inferior temporal left eye     4. History of conjuntivitis right eye   Ok to stop maxitrol oint     Plan:  Start atropine qday right eye  Pred forte BID right eye     The patient's mom was told to wear polycarbonte glasses at all times to protect the good eye. She expressed that he is unable to do so and that he would remove the glasses and not allow them to be put on.     Recheck in 8 months,    Fan Canela MD  Ophthalmology PGY-3      ~~~~~~~~~~~~~~~~~~~~~~~~~~~~~~~~~~   Complete documentation of historical and exam elements from today's encounter can be found in the full encounter summary report (not reduplicated in this progress note).  I personally obtained the chief complaint(s) and history of present illness.  I confirmed and edited as necessary the review of systems, past medical/surgical history, family history, social history, and examination findings as documented by others; and I examined the patient myself.  I personally reviewed the relevant tests, images, and reports as documented above.  I formulated and edited as necessary the assessment and plan and discussed the findings and management plan with the patient and family    Laney Bardales MD   of Ophthalmology.  Retina Service   Department of Ophthalmology and Visual Neurosciences   [facility]  Phone: [phone]   Fax: [phone]

## 2022-01-25 DIAGNOSIS — Q14.1 RETINA COLOBOMA, BILATERAL: Primary | ICD-10-CM

## 2022-10-18 DIAGNOSIS — Q14.1 RETINA COLOBOMA, BILATERAL: ICD-10-CM

## 2022-10-18 NOTE — TELEPHONE ENCOUNTER
atropine 1 % ophthalmic solution  Last Written Prescription Date:   5/27/2021  Last Fill Quantity: 5,   # refills: 11  Last Office Visit :  5/27/2021  Future Office visit:  None   Laney Bardales MD  Ophthalmology    Plan:  Start atropine qday right eye  Pred forte BID right eye      The patient's mom was told to wear polycarbonte glasses at all times to protect the good eye. She expressed that he is unable to do so and that he would remove the glasses and not allow them to be put on.      Recheck in 8 months,     Fan Canela MD  Ophthalmology PGY-3     Routing refill request to provider for review/approval because:  Second request  Over due office visit  Please call Pt to schedule.       Mana Olivia RN  Central Triage Red Flags/Med Refills

## 2022-10-25 NOTE — TELEPHONE ENCOUNTER
Atropine addresses previously with refill provider    Ok to hold on refill for prednisolone eye drop at this time til visit with dr. mckee in December-- able to move appt up if needed/requested by pt/mother    Titus Pringle RN 12:08 PM 11/09/22          Spoke to mother/son at 1400    Last visit in May 2021    Scheduled afternoon appt December 8th with Dr. Mckee    Intermittent redness in right eye per mother in AM    Pt out of atropine and Pred Forte now    Also request for eye ointment (was stopped at last visit in may)    Will review/confirm Rx signing off til seen with Dr. Catia Pringle RN 2:33 PM 11/01/22          Left message with direct number at 1511    Called and was unable to reach pt last week    Pt last visit in may 2021    Pt started atropine at that visit and was to f/u in about 8 months.    No future scheduling as of today's date    Titus Pringle RN 3:12 PM 10/25/22       [phone]

## 2022-11-01 DIAGNOSIS — H20.041 SECONDARY IRIDOCYCLITIS, NONINFECTIOUS, RIGHT: ICD-10-CM

## 2022-11-01 RX ORDER — PREDNISOLONE ACETATE 10 MG/ML
1 SUSPENSION/ DROPS OPHTHALMIC 2 TIMES DAILY
Qty: 10 ML | Refills: 1 | Status: CANCELLED | OUTPATIENT
Start: 2022-11-01

## 2022-11-01 RX ORDER — ATROPINE SULFATE 10 MG/ML
1-2 SOLUTION/ DROPS OPHTHALMIC DAILY
Qty: 5 ML | Refills: 1 | Status: CANCELLED | OUTPATIENT
Start: 2022-11-01

## 2022-11-02 RX ORDER — ATROPINE SULFATE 10 MG/ML
1 SOLUTION/ DROPS OPHTHALMIC DAILY
Qty: 5 ML | Refills: 1 | Status: SHIPPED | OUTPATIENT
Start: 2022-11-02 | End: 2023-01-18

## 2022-11-02 NOTE — TELEPHONE ENCOUNTER
atropine 1 % ophthalmic solution  Last Written Prescription Date:   5/27/2021  Last Fill Quantity: 5,   # refills: 11  Last Office Visit :  5/27/2021  Future Office visit:  12/8/2022     Laney Bardales MD  Ophthalmology    Plan:  Start atropine qday right eye  Pred forte BID right eye      The patient's mom was told to wear polycarbonte glasses at all times to protect the good eye. She expressed that he is unable to do so and that he would remove the glasses and not allow them to be put on.      Recheck in 8 months,     Fan Canela MD  Ophthalmology PGY-3       Is it one drop or two drops daily?     And Is once a day or twice a day.  Order in chart, order from pharmacy and what is mentioned in the last note do not match up.  Please review and send new updated order.       Mana Olivia RN  Central Triage Red Flags/Med Refills

## 2022-11-03 ENCOUNTER — TELEPHONE (OUTPATIENT)
Dept: OPHTHALMOLOGY | Facility: CLINIC | Age: 37
End: 2022-11-03

## 2023-01-01 ENCOUNTER — TELEPHONE (OUTPATIENT)
Dept: OPHTHALMOLOGY | Facility: CLINIC | Age: 38
End: 2023-01-01
Payer: MEDICAID

## 2023-01-01 ENCOUNTER — ANESTHESIA (OUTPATIENT)
Dept: SURGERY | Facility: CLINIC | Age: 38
End: 2023-01-01
Payer: MEDICAID

## 2023-01-01 ENCOUNTER — HOSPITAL ENCOUNTER (OUTPATIENT)
Facility: CLINIC | Age: 38
Discharge: HOME | End: 2023-11-22
Attending: OPHTHALMOLOGY | Admitting: OPHTHALMOLOGY
Payer: MEDICAID

## 2023-01-01 ENCOUNTER — OFFICE VISIT (OUTPATIENT)
Dept: OPHTHALMOLOGY | Facility: CLINIC | Age: 38
End: 2023-01-01
Attending: OPHTHALMOLOGY
Payer: MEDICAID

## 2023-01-01 ENCOUNTER — OFFICE VISIT (OUTPATIENT)
Dept: OPHTHALMOLOGY | Facility: CLINIC | Age: 38
End: 2023-01-01
Payer: MEDICAID

## 2023-01-01 ENCOUNTER — DOCUMENTATION ONLY (OUTPATIENT)
Dept: OTHER | Facility: CLINIC | Age: 38
End: 2023-01-01
Payer: MEDICAID

## 2023-01-01 ENCOUNTER — ANESTHESIA EVENT (OUTPATIENT)
Dept: SURGERY | Facility: CLINIC | Age: 38
End: 2023-01-01
Payer: MEDICAID

## 2023-01-01 VITALS
SYSTOLIC BLOOD PRESSURE: 121 MMHG | HEIGHT: 60 IN | HEART RATE: 98 BPM | WEIGHT: 128.09 LBS | RESPIRATION RATE: 17 BRPM | OXYGEN SATURATION: 95 % | DIASTOLIC BLOOD PRESSURE: 85 MMHG | TEMPERATURE: 97.9 F | BODY MASS INDEX: 25.15 KG/M2

## 2023-01-01 DIAGNOSIS — H57.10 BLIND PAINFUL EYE: ICD-10-CM

## 2023-01-01 DIAGNOSIS — H15.832 STAPHYLOMA POSTICUM OF LEFT EYE: ICD-10-CM

## 2023-01-01 DIAGNOSIS — H54.40 BLIND PAINFUL EYE: ICD-10-CM

## 2023-01-01 DIAGNOSIS — H20.041 SECONDARY IRIDOCYCLITIS, NONINFECTIOUS, RIGHT: ICD-10-CM

## 2023-01-01 DIAGNOSIS — Z98.890 POSTOPERATIVE EYE STATE: ICD-10-CM

## 2023-01-01 DIAGNOSIS — H54.40 BLIND PAINFUL RIGHT EYE: Primary | ICD-10-CM

## 2023-01-01 DIAGNOSIS — H57.10 BLIND PAINFUL EYE: Primary | ICD-10-CM

## 2023-01-01 DIAGNOSIS — H57.11 BLIND PAINFUL RIGHT EYE: Primary | ICD-10-CM

## 2023-01-01 DIAGNOSIS — H54.40 BLIND PAINFUL EYE: Primary | ICD-10-CM

## 2023-01-01 DIAGNOSIS — Z98.890 POSTOPERATIVE EYE STATE: Primary | ICD-10-CM

## 2023-01-01 LAB
PATH REPORT.COMMENTS IMP SPEC: NORMAL
PATH REPORT.FINAL DX SPEC: NORMAL
PATH REPORT.GROSS SPEC: NORMAL
PATH REPORT.MICROSCOPIC SPEC OTHER STN: NORMAL
PATH REPORT.RELEVANT HX SPEC: NORMAL
PHOTO IMAGE: NORMAL

## 2023-01-01 PROCEDURE — 258N000003 HC RX IP 258 OP 636

## 2023-01-01 PROCEDURE — 999N000127 HC STATISTIC PERIPHERAL IV START W US GUIDANCE

## 2023-01-01 PROCEDURE — 999N000141 HC STATISTIC PRE-PROCEDURE NURSING ASSESSMENT: Performed by: OPHTHALMOLOGY

## 2023-01-01 PROCEDURE — 360N000077 HC SURGERY LEVEL 4, PER MIN: Performed by: OPHTHALMOLOGY

## 2023-01-01 PROCEDURE — 76510 OPH US DX B-SCAN&QUAN A-SCAN: CPT | Performed by: OPHTHALMOLOGY

## 2023-01-01 PROCEDURE — 99214 OFFICE O/P EST MOD 30 MIN: CPT | Performed by: OPHTHALMOLOGY

## 2023-01-01 PROCEDURE — 710N000012 HC RECOVERY PHASE 2, PER MINUTE: Performed by: OPHTHALMOLOGY

## 2023-01-01 PROCEDURE — 250N000011 HC RX IP 250 OP 636

## 2023-01-01 PROCEDURE — L8610 OCULAR IMPLANT: HCPCS | Performed by: OPHTHALMOLOGY

## 2023-01-01 PROCEDURE — G0463 HOSPITAL OUTPT CLINIC VISIT: HCPCS | Performed by: OPHTHALMOLOGY

## 2023-01-01 PROCEDURE — 710N000010 HC RECOVERY PHASE 1, LEVEL 2, PER MIN: Performed by: OPHTHALMOLOGY

## 2023-01-01 PROCEDURE — 258N000003 HC RX IP 258 OP 636: Performed by: ANESTHESIOLOGY

## 2023-01-01 PROCEDURE — 272N000004 HC RX 272: Performed by: OPHTHALMOLOGY

## 2023-01-01 PROCEDURE — 65093 REVISE EYE WITH IMPLANT: CPT | Mod: RT | Performed by: OPHTHALMOLOGY

## 2023-01-01 PROCEDURE — 88313 SPECIAL STAINS GROUP 2: CPT | Mod: 26 | Performed by: OPHTHALMOLOGY

## 2023-01-01 PROCEDURE — 250N000011 HC RX IP 250 OP 636: Performed by: ANESTHESIOLOGY

## 2023-01-01 PROCEDURE — 250N000025 HC SEVOFLURANE, PER MIN: Performed by: OPHTHALMOLOGY

## 2023-01-01 PROCEDURE — 250N000009 HC RX 250: Performed by: OPHTHALMOLOGY

## 2023-01-01 PROCEDURE — 250N000011 HC RX IP 250 OP 636: Performed by: STUDENT IN AN ORGANIZED HEALTH CARE EDUCATION/TRAINING PROGRAM

## 2023-01-01 PROCEDURE — 250N000009 HC RX 250

## 2023-01-01 PROCEDURE — 99213 OFFICE O/P EST LOW 20 MIN: CPT | Mod: GC | Performed by: OPHTHALMOLOGY

## 2023-01-01 PROCEDURE — 88305 TISSUE EXAM BY PATHOLOGIST: CPT | Mod: 26 | Performed by: OPHTHALMOLOGY

## 2023-01-01 PROCEDURE — 88305 TISSUE EXAM BY PATHOLOGIST: CPT | Mod: TC | Performed by: OPHTHALMOLOGY

## 2023-01-01 PROCEDURE — 67875 CLOSURE OF EYELID BY SUTURE: CPT | Mod: RT | Performed by: OPHTHALMOLOGY

## 2023-01-01 PROCEDURE — 99024 POSTOP FOLLOW-UP VISIT: CPT | Mod: GC | Performed by: OPHTHALMOLOGY

## 2023-01-01 PROCEDURE — 272N000001 HC OR GENERAL SUPPLY STERILE: Performed by: OPHTHALMOLOGY

## 2023-01-01 PROCEDURE — 370N000017 HC ANESTHESIA TECHNICAL FEE, PER MIN: Performed by: OPHTHALMOLOGY

## 2023-01-01 DEVICE — EYE IMP SPHERE SILICONE 18MM S6.1018U: Type: IMPLANTABLE DEVICE | Site: EYE | Status: FUNCTIONAL

## 2023-01-01 RX ORDER — BACITRACIN ZINC AND POLYMYXIN B SULFATE 500; 10000 [USP'U]/G; [USP'U]/G
0.5 OINTMENT OPHTHALMIC DAILY
Qty: 3.5 G | Refills: 1 | Status: SHIPPED | OUTPATIENT
Start: 2023-01-01 | End: 2023-01-01

## 2023-01-01 RX ORDER — ATROPINE SULFATE 10 MG/ML
1 SOLUTION/ DROPS OPHTHALMIC 2 TIMES DAILY
Qty: 5 ML | Refills: 1 | Status: SHIPPED | OUTPATIENT
Start: 2023-01-01

## 2023-01-01 RX ORDER — OXYCODONE HYDROCHLORIDE 10 MG/1
10 TABLET ORAL
Status: DISCONTINUED | OUTPATIENT
Start: 2023-01-01 | End: 2023-01-01 | Stop reason: HOSPADM

## 2023-01-01 RX ORDER — ONDANSETRON 4 MG/1
4 TABLET, ORALLY DISINTEGRATING ORAL EVERY 30 MIN PRN
Status: DISCONTINUED | OUTPATIENT
Start: 2023-01-01 | End: 2023-01-01 | Stop reason: HOSPADM

## 2023-01-01 RX ORDER — LIDOCAINE HYDROCHLORIDE 20 MG/ML
INJECTION, SOLUTION INFILTRATION; PERINEURAL PRN
Status: DISCONTINUED | OUTPATIENT
Start: 2023-01-01 | End: 2023-01-01

## 2023-01-01 RX ORDER — LIDOCAINE HYDROCHLORIDE AND EPINEPHRINE 10; 10 MG/ML; UG/ML
INJECTION, SOLUTION INFILTRATION; PERINEURAL PRN
Status: DISCONTINUED | OUTPATIENT
Start: 2023-01-01 | End: 2023-01-01 | Stop reason: HOSPADM

## 2023-01-01 RX ORDER — CEFAZOLIN SODIUM/WATER 2 G/20 ML
2 SYRINGE (ML) INTRAVENOUS
Status: COMPLETED | OUTPATIENT
Start: 2023-01-01 | End: 2023-01-01

## 2023-01-01 RX ORDER — HYDROMORPHONE HYDROCHLORIDE 1 MG/ML
0.4 INJECTION, SOLUTION INTRAMUSCULAR; INTRAVENOUS; SUBCUTANEOUS EVERY 5 MIN PRN
Status: DISCONTINUED | OUTPATIENT
Start: 2023-01-01 | End: 2023-01-01 | Stop reason: HOSPADM

## 2023-01-01 RX ORDER — SODIUM CHLORIDE, SODIUM LACTATE, POTASSIUM CHLORIDE, CALCIUM CHLORIDE 600; 310; 30; 20 MG/100ML; MG/100ML; MG/100ML; MG/100ML
INJECTION, SOLUTION INTRAVENOUS CONTINUOUS PRN
Status: DISCONTINUED | OUTPATIENT
Start: 2023-01-01 | End: 2023-01-01

## 2023-01-01 RX ORDER — SODIUM CHLORIDE, SODIUM LACTATE, POTASSIUM CHLORIDE, CALCIUM CHLORIDE 600; 310; 30; 20 MG/100ML; MG/100ML; MG/100ML; MG/100ML
INJECTION, SOLUTION INTRAVENOUS CONTINUOUS
Status: DISCONTINUED | OUTPATIENT
Start: 2023-01-01 | End: 2023-01-01 | Stop reason: HOSPADM

## 2023-01-01 RX ORDER — DEXAMETHASONE SODIUM PHOSPHATE 4 MG/ML
INJECTION, SOLUTION INTRA-ARTICULAR; INTRALESIONAL; INTRAMUSCULAR; INTRAVENOUS; SOFT TISSUE PRN
Status: DISCONTINUED | OUTPATIENT
Start: 2023-01-01 | End: 2023-01-01

## 2023-01-01 RX ORDER — BACITRACIN ZINC AND POLYMYXIN B SULFATE 500; 10000 [USP'U]/G; [USP'U]/G
OINTMENT OPHTHALMIC
Qty: 3.5 G | Refills: 1 | Status: SHIPPED | OUTPATIENT
Start: 2023-01-01 | End: 2024-01-01

## 2023-01-01 RX ORDER — CEFAZOLIN SODIUM/WATER 2 G/20 ML
2 SYRINGE (ML) INTRAVENOUS SEE ADMIN INSTRUCTIONS
Status: DISCONTINUED | OUTPATIENT
Start: 2023-01-01 | End: 2023-01-01 | Stop reason: HOSPADM

## 2023-01-01 RX ORDER — OXYCODONE HYDROCHLORIDE 5 MG/1
5 TABLET ORAL
Status: DISCONTINUED | OUTPATIENT
Start: 2023-01-01 | End: 2023-01-01 | Stop reason: HOSPADM

## 2023-01-01 RX ORDER — OXYCODONE HYDROCHLORIDE 5 MG/1
5 TABLET ORAL EVERY 6 HOURS PRN
Qty: 12 TABLET | Refills: 0 | Status: SHIPPED | OUTPATIENT
Start: 2023-01-01 | End: 2023-01-01

## 2023-01-01 RX ORDER — NEOMYCIN SULFATE, POLYMYXIN B SULFATE, AND DEXAMETHASONE 3.5; 10000; 1 MG/G; [USP'U]/G; MG/G
OINTMENT OPHTHALMIC
Qty: 7 G | Refills: 1 | Status: SHIPPED | OUTPATIENT
Start: 2023-01-01

## 2023-01-01 RX ORDER — ONDANSETRON 2 MG/ML
4 INJECTION INTRAMUSCULAR; INTRAVENOUS EVERY 30 MIN PRN
Status: DISCONTINUED | OUTPATIENT
Start: 2023-01-01 | End: 2023-01-01 | Stop reason: HOSPADM

## 2023-01-01 RX ORDER — FENTANYL CITRATE 50 UG/ML
50 INJECTION, SOLUTION INTRAMUSCULAR; INTRAVENOUS EVERY 5 MIN PRN
Status: DISCONTINUED | OUTPATIENT
Start: 2023-01-01 | End: 2023-01-01 | Stop reason: HOSPADM

## 2023-01-01 RX ORDER — ERYTHROMYCIN 5 MG/G
OINTMENT OPHTHALMIC
Qty: 3.5 G | Refills: 1 | Status: SHIPPED | OUTPATIENT
Start: 2023-01-01

## 2023-01-01 RX ORDER — PROPOFOL 10 MG/ML
INJECTION, EMULSION INTRAVENOUS PRN
Status: DISCONTINUED | OUTPATIENT
Start: 2023-01-01 | End: 2023-01-01

## 2023-01-01 RX ORDER — FENTANYL CITRATE 50 UG/ML
25 INJECTION, SOLUTION INTRAMUSCULAR; INTRAVENOUS EVERY 5 MIN PRN
Status: DISCONTINUED | OUTPATIENT
Start: 2023-01-01 | End: 2023-01-01 | Stop reason: HOSPADM

## 2023-01-01 RX ORDER — NEOMYCIN SULFATE, POLYMYXIN B SULFATE, AND DEXAMETHASONE 3.5; 10000; 1 MG/G; [USP'U]/G; MG/G
OINTMENT OPHTHALMIC
Qty: 3.5 G | Refills: 2 | Status: SHIPPED | OUTPATIENT
Start: 2023-01-01 | End: 2023-01-01

## 2023-01-01 RX ORDER — FENTANYL CITRATE 50 UG/ML
INJECTION, SOLUTION INTRAMUSCULAR; INTRAVENOUS PRN
Status: DISCONTINUED | OUTPATIENT
Start: 2023-01-01 | End: 2023-01-01

## 2023-01-01 RX ORDER — ALCOHOL 1 ML/ML
INJECTION, SOLUTION PERCUTANEOUS PRN
Status: DISCONTINUED | OUTPATIENT
Start: 2023-01-01 | End: 2023-01-01 | Stop reason: HOSPADM

## 2023-01-01 RX ORDER — PREDNISOLONE ACETATE 10 MG/ML
1-2 SUSPENSION/ DROPS OPHTHALMIC 4 TIMES DAILY
Qty: 5 ML | Refills: 3 | Status: SHIPPED | OUTPATIENT
Start: 2023-01-01

## 2023-01-01 RX ORDER — ONDANSETRON 2 MG/ML
INJECTION INTRAMUSCULAR; INTRAVENOUS PRN
Status: DISCONTINUED | OUTPATIENT
Start: 2023-01-01 | End: 2023-01-01

## 2023-01-01 RX ORDER — HYDROMORPHONE HYDROCHLORIDE 1 MG/ML
0.2 INJECTION, SOLUTION INTRAMUSCULAR; INTRAVENOUS; SUBCUTANEOUS EVERY 5 MIN PRN
Status: DISCONTINUED | OUTPATIENT
Start: 2023-01-01 | End: 2023-01-01 | Stop reason: HOSPADM

## 2023-01-01 RX ADMIN — Medication 2 G: at 13:27

## 2023-01-01 RX ADMIN — PROPOFOL 150 MG: 10 INJECTION, EMULSION INTRAVENOUS at 13:27

## 2023-01-01 RX ADMIN — ONDANSETRON 4 MG: 2 INJECTION INTRAMUSCULAR; INTRAVENOUS at 13:57

## 2023-01-01 RX ADMIN — SODIUM CHLORIDE, POTASSIUM CHLORIDE, SODIUM LACTATE AND CALCIUM CHLORIDE: 600; 310; 30; 20 INJECTION, SOLUTION INTRAVENOUS at 13:27

## 2023-01-01 RX ADMIN — FENTANYL CITRATE 25 MCG: 50 INJECTION INTRAMUSCULAR; INTRAVENOUS at 14:27

## 2023-01-01 RX ADMIN — DEXMEDETOMIDINE HYDROCHLORIDE 8 MCG: 100 INJECTION, SOLUTION INTRAVENOUS at 13:56

## 2023-01-01 RX ADMIN — SODIUM CHLORIDE, POTASSIUM CHLORIDE, SODIUM LACTATE AND CALCIUM CHLORIDE: 600; 310; 30; 20 INJECTION, SOLUTION INTRAVENOUS at 15:00

## 2023-01-01 RX ADMIN — PHENYLEPHRINE HYDROCHLORIDE 100 MCG: 10 INJECTION INTRAVENOUS at 14:17

## 2023-01-01 RX ADMIN — MIDAZOLAM 2 MG: 1 INJECTION INTRAMUSCULAR; INTRAVENOUS at 13:09

## 2023-01-01 RX ADMIN — PHENYLEPHRINE HYDROCHLORIDE 100 MCG: 10 INJECTION INTRAVENOUS at 14:14

## 2023-01-01 RX ADMIN — DEXMEDETOMIDINE HYDROCHLORIDE 8 MCG: 100 INJECTION, SOLUTION INTRAVENOUS at 13:14

## 2023-01-01 RX ADMIN — LIDOCAINE HYDROCHLORIDE 40 MG: 20 INJECTION, SOLUTION INFILTRATION; PERINEURAL at 13:27

## 2023-01-01 RX ADMIN — FENTANYL CITRATE 25 MCG: 50 INJECTION INTRAMUSCULAR; INTRAVENOUS at 15:37

## 2023-01-01 RX ADMIN — DEXAMETHASONE SODIUM PHOSPHATE 8 MG: 4 INJECTION, SOLUTION INTRA-ARTICULAR; INTRALESIONAL; INTRAMUSCULAR; INTRAVENOUS; SOFT TISSUE at 13:43

## 2023-01-01 ASSESSMENT — ACTIVITIES OF DAILY LIVING (ADL)
ADLS_ACUITY_SCORE: 35
ADLS_ACUITY_SCORE: 33
ADLS_ACUITY_SCORE: 35

## 2023-01-01 ASSESSMENT — EXTERNAL EXAM - RIGHT EYE
OD_EXAM: NORMAL

## 2023-01-01 ASSESSMENT — CONF VISUAL FIELD
COMMENTS: UNABLE
OS_SUPERIOR_TEMPORAL_RESTRICTION: 0
OD_INFERIOR_NASAL_RESTRICTION: 1
OS_SUPERIOR_NASAL_RESTRICTION: 0
OS_INFERIOR_TEMPORAL_RESTRICTION: 0
OS_INFERIOR_NASAL_RESTRICTION: 0
OD_SUPERIOR_NASAL_RESTRICTION: 1
OD_SUPERIOR_TEMPORAL_RESTRICTION: 1
METHOD: TOYS
OD_INFERIOR_TEMPORAL_RESTRICTION: 1
OS_NORMAL: 1

## 2023-01-01 ASSESSMENT — SLIT LAMP EXAM - LIDS
COMMENTS: NORMAL

## 2023-01-01 ASSESSMENT — VISUAL ACUITY
OS_SC: UNABLE
METHOD: SNELLEN - LINEAR
OD_SC: UNABLE
OD_SC: NLP
OS_SC: FIX AND FOLLOW
METHOD: SNELLEN - LINEAR
METHOD: FIXATION
OS_SC: UNABLE
OD_SC: UNABLE

## 2023-01-01 ASSESSMENT — EXTERNAL EXAM - LEFT EYE
OS_EXAM: NORMAL

## 2023-01-01 ASSESSMENT — TONOMETRY
IOP_METHOD: ICARE
OS_IOP_MMHG: 21
OD_IOP_MMHG: 30
OS_IOP_MMHG: 24
OD_IOP_MMHG: -
OS_IOP_MMHG: UNABLE
IOP_METHOD: ICARE
OD_IOP_MMHG: 24
IOP_METHOD: ICARE

## 2023-01-01 ASSESSMENT — ENCOUNTER SYMPTOMS: SEIZURES: 1

## 2023-01-11 ENCOUNTER — OFFICE VISIT (OUTPATIENT)
Dept: OPHTHALMOLOGY | Facility: CLINIC | Age: 38
End: 2023-01-11
Attending: OPHTHALMOLOGY
Payer: MEDICAID

## 2023-01-11 DIAGNOSIS — H20.041 SECONDARY IRIDOCYCLITIS, NONINFECTIOUS, RIGHT: Primary | ICD-10-CM

## 2023-01-11 DIAGNOSIS — Q14.1 RETINA COLOBOMA, BILATERAL: ICD-10-CM

## 2023-01-11 DIAGNOSIS — H54.40 BLIND PAINFUL EYE: ICD-10-CM

## 2023-01-11 DIAGNOSIS — H57.10 BLIND PAINFUL EYE: ICD-10-CM

## 2023-01-11 PROCEDURE — 99214 OFFICE O/P EST MOD 30 MIN: CPT | Mod: GC | Performed by: OPHTHALMOLOGY

## 2023-01-11 PROCEDURE — G0463 HOSPITAL OUTPT CLINIC VISIT: HCPCS

## 2023-01-11 RX ORDER — NEOMYCIN SULFATE, POLYMYXIN B SULFATE, AND DEXAMETHASONE 3.5; 10000; 1 MG/G; [USP'U]/G; MG/G
0.5 OINTMENT OPHTHALMIC AT BEDTIME
Qty: 3.5 G | Refills: 11 | Status: SHIPPED | OUTPATIENT
Start: 2023-01-11 | End: 2023-01-11

## 2023-01-11 RX ORDER — PREDNISOLONE ACETATE 10 MG/ML
1 SUSPENSION/ DROPS OPHTHALMIC 2 TIMES DAILY
Qty: 15 ML | Refills: 11 | Status: SHIPPED | OUTPATIENT
Start: 2023-01-11 | End: 2023-01-18

## 2023-01-11 RX ORDER — MINERAL OIL, PETROLATUM 425; 568 MG/G; MG/G
1 OINTMENT OPHTHALMIC AT BEDTIME
Qty: 3.5 G | Refills: 11 | Status: SHIPPED | OUTPATIENT
Start: 2023-01-11 | End: 2023-01-18

## 2023-01-11 RX ORDER — NEOMYCIN SULFATE, POLYMYXIN B SULFATE, AND DEXAMETHASONE 3.5; 10000; 1 MG/G; [USP'U]/G; MG/G
0.5 OINTMENT OPHTHALMIC AT BEDTIME
Qty: 3.5 G | Refills: 0 | Status: SHIPPED | OUTPATIENT
Start: 2023-01-11 | End: 2023-01-18

## 2023-01-11 RX ORDER — ATROPINE SULFATE 10 MG/ML
1-2 SOLUTION/ DROPS OPHTHALMIC DAILY
Qty: 5 ML | Refills: 11 | Status: SHIPPED | OUTPATIENT
Start: 2023-01-11 | End: 2023-01-18

## 2023-01-11 ASSESSMENT — VISUAL ACUITY
OD_SC: UNABLE
OS_SC: UNABLE
METHOD: SNELLEN - LINEAR

## 2023-01-11 ASSESSMENT — CONF VISUAL FIELD: COMMENTS: UNABLE

## 2023-01-11 ASSESSMENT — SLIT LAMP EXAM - LIDS
COMMENTS: NORMAL
COMMENTS: NORMAL

## 2023-01-11 ASSESSMENT — EXTERNAL EXAM - RIGHT EYE: OD_EXAM: NORMAL

## 2023-01-11 ASSESSMENT — TONOMETRY
OD_IOP_MMHG: 28
OS_IOP_MMHG: 18
IOP_METHOD: ICARE

## 2023-01-11 ASSESSMENT — EXTERNAL EXAM - LEFT EYE: OS_EXAM: NORMAL

## 2023-01-11 NOTE — NURSING NOTE
Chief Complaints and History of Present Illnesses   Patient presents with     Follow Up     1.5 year follow up History of old Retinal detachment right eye      Chief Complaint(s) and History of Present Illness(es)     Follow Up            Comments: 1.5 year follow up History of old Retinal detachment right eye           Comments    Pt with his mother today.  Per mom RE has been red for a few months now. Pt typically using Maxitrol ointment, prednisolone and atropine but has been out of all the medications for at least 6 months now.  No discharge.     ESVIN Hernández January 11, 2023 11:13 AM

## 2023-01-11 NOTE — PROGRESS NOTES
CC: follow up Retinal detachment right eye ; discharge from right eye    HPI: right eye: status post 23g Pars plana vitrectomy (PPV)/ endolaser/ intraocular lens removal and capsular tension ring removal right eye 8.15.17. Patient with chronic Retinal detachment  And dislocated intraocular lens   Intra-op findings: vitreous hemorrhage,total Retinal detachment with the inferior retina dragged anteriorly and superiorly over the superior retina where the anterior retina was attached to each other. The choroid was visualized inferiorly and the inferior retina was avulsed from the optic nerve head and only a small part of the superior nasal retina was attached. 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 The retinal detachment was inoperable.     Interim: Jerry has been having pain in his right eye ever since running out of his atropine and prednisolone drop 6 months ago (was not in stock reportedly). He continues to have redness in his right eye but nothing out of the ordinary.     optos image 5-27-21  Left eye with posterior staphyloma and temporal peripheral Retinal pigment epithelium changes - possible flat choroidal lesion and peripheral areas of white without pressure     Ultrasound:   Right eye: old Retinal detachment   Left eye: posterior staphyloma and few vitreous debri; no Retinal detachment      Assessment   1. History of old Retinal detachment right eye   -The retinal detachment was inoperable.   - cornea decompensation  - good intraocular pressure  - per mom, eye occasional with redness and pain    Recommend artificial tears as needed   Start atropine qday right eye  Pred forte BID right eye     2- monocular patient. Refractive error left eye   The patient was told to wear polycarbonte glasses at all times to protect the good eye.  he expressed understanding.    Mother states jerry did not tolerate prescription. Currently not using glasses.  Occasional goggles    3. 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 History of conjuntivitis right eye   Ok to use maxitrol ointment if helps    Plan:  Restart atropine qday right eye  Restart Pred forte BID right eye     The patient's mom was told to wear polycarbonte glasses at all times to protect the good eye. She expressed that he is unable to do so and that he would remove the glasses and not allow them to be put on.  If persistent/wosrsening pain right eye, will need oculoplastics referral for consideration of enucleation     RTC 2 years, WARREN Alvarenga MD  Ophthalmology Resident, PGY-3  [facility]        ~~~~~~~~~~~~~~~~~~~~~~~~~~~~~~~~~~   Complete documentation of historical and exam elements from today's encounter can be found in the full encounter summary report (not reduplicated in this progress note).  I personally obtained the chief complaint(s) and history of present illness.  I confirmed and edited as necessary the review of systems, past medical/surgical history, family history, social history, and examination findings as documented by others; and I examined the patient myself.  I personally reviewed the relevant tests, images, and reports as documented above.  I formulated and edited as necessary the assessment and plan and discussed the findings and management plan with the patient and family    Laney Bardales MD   of Ophthalmology.  Retina Service   Department of Ophthalmology and Visual Neurosciences   [facility]  Phone: [phone]   Fax: [phone]

## 2023-01-18 ENCOUNTER — TELEPHONE (OUTPATIENT)
Dept: OPHTHALMOLOGY | Facility: CLINIC | Age: 38
End: 2023-01-18
Payer: MEDICAID

## 2023-01-18 DIAGNOSIS — Q14.1 RETINA COLOBOMA, BILATERAL: ICD-10-CM

## 2023-01-18 DIAGNOSIS — H54.40 BLIND PAINFUL EYE: ICD-10-CM

## 2023-01-18 DIAGNOSIS — H57.10 BLIND PAINFUL EYE: ICD-10-CM

## 2023-01-18 DIAGNOSIS — H20.041 SECONDARY IRIDOCYCLITIS, NONINFECTIOUS, RIGHT: ICD-10-CM

## 2023-01-18 RX ORDER — ATROPINE SULFATE 10 MG/ML
1-2 SOLUTION/ DROPS OPHTHALMIC DAILY
Qty: 5 ML | Refills: 2 | Status: SHIPPED | OUTPATIENT
Start: 2023-01-18

## 2023-01-18 RX ORDER — MINERAL OIL, PETROLATUM 425; 568 MG/G; MG/G
1 OINTMENT OPHTHALMIC AT BEDTIME
Qty: 3.5 G | Refills: 11 | Status: SHIPPED | OUTPATIENT
Start: 2023-01-18

## 2023-01-18 RX ORDER — NEOMYCIN SULFATE, POLYMYXIN B SULFATE, AND DEXAMETHASONE 3.5; 10000; 1 MG/G; [USP'U]/G; MG/G
OINTMENT OPHTHALMIC
Qty: 3.5 G | Refills: 2 | Status: SHIPPED | OUTPATIENT
Start: 2023-01-18

## 2023-01-18 RX ORDER — PREDNISOLONE ACETATE 10 MG/ML
1 SUSPENSION/ DROPS OPHTHALMIC 2 TIMES DAILY
Qty: 10 ML | Refills: 2 | Status: SHIPPED | OUTPATIENT
Start: 2023-01-18

## 2023-01-18 NOTE — TELEPHONE ENCOUNTER
Pt with medicaid insurance and Rx's signed off by Dr. Bardales's resident-- needs staff MD signature    Rx's sent under Dr. Bardales    Updated mother Rx's sent and should be able to obtain    Provided direct number if having any trouble obtaining     Titus Pringle RN 3:47 PM 01/18/23              [facility]    Phone Message    May a detailed message be left on voicemail: yes     Reason for Call: Medication Question or concern regarding medication   Prescription Clarification  Name of Medication:   prednisoLONE acetate (PRED FORTE) 1 % ophthalmic suspension  atropine 1 % ophthalmic solution  Prescribing Provider: Dr. Bardales   Pharmacy: [id number] [place], [place]   What on the order needs clarification? Patient's mom Nicole calling in as pharmacy states insurance has not approved these two medications. They are wondering if there are alternative medications that can be prescribed. Please call patient's mom back to discuss, thank you.    Action Taken: Message routed to:  [facility] E[facility]    Travel Screening: Not Applicable

## 2023-08-02 NOTE — PROGRESS NOTES
CC: follow up Retinal detachment right eye ; discharge from right eye    HPI: right eye: status post 23g Pars plana vitrectomy (PPV)/ endolaser/ intraocular lens removal and capsular tension ring removal right eye 8.15.17. Patient with chronic Retinal detachment  And dislocated intraocular lens   Intra-op findings: vitreous hemorrhage,total Retinal detachment with the inferior retina dragged anteriorly and superiorly over the superior retina where the anterior retina was attached to each other. The choroid was visualized inferiorly and the inferior retina was avulsed from the optic nerve head and only a small part of the superior nasal retina was attached. 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 The retinal detachment was inoperable.     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   - cornea decompensation with bullae and punctate epithelial erosions - likely cause of discomfort  - intraocular pressure 24  - per mom, eye occasional with redness and pain    Recommend artificial tears as needed   Start bacitracin oint twice a day right eye   Recommend follow up with oculoplastics to discuss enucleation.    # monocular patient. Refractive error left eye   The patient was told to wear polycarbonte glasses at all times to protect the good eye.  he expressed understanding.    Mother states marti did not tolerate prescription. Currently not using glasses.  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 She expressed that he is unable to do so and that he would remove the glasses and not allow them to be put on.     will need oculoplastics referral for consideration of enucleation next available    RTC 1 years, dilated fundus exam retina clinic  ~~~~~~~~~~~~~~~~~~~~~~~~~~~~~~~~~~   Complete documentation of historical and exam elements from today's encounter can be found in the full encounter summary report (not reduplicated in this progress note).  I personally obtained the chief complaint(s) and history of present illness.  I confirmed and edited as necessary the review of systems, past medical/surgical history, family history, social history, and examination findings as documented by others; and I examined the patient myself.  I personally reviewed the relevant tests, images, and reports as documented above.  I formulated and edited as necessary the assessment and plan and discussed the findings and management plan with the patient and family    Laney Bardales MD   of Ophthalmology.  Retina Service   Department of Ophthalmology and Visual Neurosciences   [facility]  Phone: [phone]   Fax: [phone]

## 2023-08-22 NOTE — TELEPHONE ENCOUNTER
Spoke with patient regarding scheduling for a sooner appointment from the wait-list. Patient was able to schedule as offered. Patient is aware of time and location.-Per Patient

## 2023-10-25 NOTE — PATIENT INSTRUCTIONS
Enucleation and Evisceration     What are enucleation and evisceration?  Enucleation is the surgical removal of the entire eye. Evisceration is the surgical removal of the contents of the eye, leaving the white part of the eye and the eye muscles intact.     Why is enucleation or evisceration necessary?  Removal of an eye may be required following a severe injury, to control pain in a blind eye, to treat some intra-ocular tumors, to alleviate a severe infection inside the eye, or for cosmetic improvement of a disfigured eye.     Why chose one procedure over another?  Enucleation is the procedure of choice if the eye is being removed to treat an intraocular tumor, or to try to reduce the risk of developing a severe autoimmune condition called sympathetic ophthalmia following trauma. In most other situations, either enucleation or evisceration can each achieve the desired objective. Your surgeon will help you to determine which surgery is most appropriate for your condition.     How is the surgery performed?  Both surgeries are usually performed in the operating room under general anesthesia, although they can be completed safely using local anesthesia with sedation. After enucleation or evisceration, most of the lost volume is replaced by an implant placed in the eye socket. The implant is a usually a sphere made of silicone rubber, polyethylene, hydroxyapatite, or alumina, and is covered by the patient's own tissue. In most cases, the eye muscles are attached to the implant following enucleation, in order to preserve eye movement. Several weeks after surgery, an artificial eye, or prosthesis, is made by an . The front surface of the artificial eye is custom painted to match the patient's other eye. The back surface is custom molded to fit exactly in the eye socket for maximum comfort and movement. The prosthesis is easily removable, and may be removed as needed for cleaning. Most patients sleep with the  prosthesis in place. A prosthesis lasts up to 5-10 years in many patients     What is the post-operative care?  Some patients spend the night at the hospital, while others go home the same day as surgery. You may be asked to take medications after surgery such as antibiotics, steroids, or pain-relievers. Patients may wear a patch after surgery for several days to several weeks, until they receive their prosthesis. Continued follow-up is important as the tissues in the socket may atrophy (shrink) with time. This loss of volume may lead to eyelid laxity or socket changes that may affect the fit of the prosthesis. Careful monitoring of the socket and prosthesis by the surgeon and the  will help keep the socket healthy, and will allow for early detection of any changes that may require further treatment.     What are the risks and complications?  Short-term risks for this surgery, as with any surgery, include bleeding and infection. Longer-range complications include discharge and socket irritation or exposure of the implant. As with any medical procedure, there may be other inherent risks that should be discussed with your surgeon.     Who performs the surgery?  Patients are most commonly treated by ophthalmic plastic and reconstructive surgeons who specialize in diseases and problems of the eyelids, tear drain, and orbit (the area around the eye). Your surgeon has completed an American Society of Ophthalmic Plastic and Reconstructive Surgery (ASOPRS) fellowship. This indicates that he is not only a board certified ophthalmologist, but also has had extensive training in ophthalmic plastic surgery. When you are ready, you will be in experienced hands. Your surgery will be in an outpatient facility or at a hospital depending on your surgical needs.

## 2023-10-25 NOTE — NURSING NOTE
Chief Complaint(s) and History of Present Illness(es)       Enucleation consult              Comments: Dr. 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 Lost vision in that eye at least 8 yrs ago. Jerry wake s up almost daily with red eye. Hits eye and cries, so mom knows it is bothering him.  Inf: mom

## 2023-10-25 NOTE — PROGRESS NOTES
Chief Complaints and History of Present Illnesses   Patient presents with    Enucleation consult     Dr. 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 Lost vision in that eye at least 8 yrs ago. Jerry wake s up almost daily with red eye. Hits eye and cries, so mom knows it is bothering him.  Inf: mom     Chief Complaint(s) and History of Present Illness(es)     Enucleation consult     Additional comments: Dr. Bardales requests evaluation for possible   enucleation of blind painful eye. Has old RD. Lost vision in that eye at   least 8 yrs ago. Jerry wakes up almost daily with red eye. Hits eye and   cries, so mom knows it is bothering him.  Inf: mom         Assessment & Plan     Jerry Ingram is a 37 year old male with the following diagnoses:   1. Blind painful right eye         Long-standing history of headaches and consequent self-injurious behavior, including head- and eye-trauma, with subsequent retinal detachment and lens dislocation requiring retina surgery (2017). Following with Dr. Bardales since.   Per patient's mother, the patient has been indicating to her that the right eye has been painful for the last two months. She also states that the right eye has appeared red during this time. No recent self-injurious behavior.    Recommend right eye evisceration with silicone implant.  Discussed r/b/a of evisceration, patient's mother would like to proceed.    Use Predforte  and Atropine until surgery  Continue bacitracin ointment         Uma Mendenhall MD  Oculoplastic Surgery Fellow

## 2023-11-06 NOTE — TELEPHONE ENCOUNTER
Patient is schedule for surgery with: Dr. Rossi    Surgery Date: 11 /22    Location: [facility]    H&P: to be completed by Primary Care team - patient instructed to schedule [facility]     Post-op:  12/11, in-person visit, with Dr. Rossi    Patient will receive a phone call from pre-admission nurses 1-2 days prior to surgery with arrival time and NPO instructions.    Patient aware times are subject to change up until day before surgery.     Patient questions/concerns: N/A     Surgery packet was sent via US mail on 11/6      Princess Mcnamara on 11/6/2023 at 4:07 PM

## 2023-11-22 NOTE — ANESTHESIA PREPROCEDURE EVALUATION
Anesthesia Pre-Procedure Evaluation    Patient: Jerry Ingram   MRN: [id number] : 1985        Procedure : Procedure(s):  RIGHT EVISCERATION, OCULAR CONTENTS WITH IMPLANT          Past Medical History:   Diagnosis Date    Cataract, right eye     Cerebral palsy (H)     G tube feedings (H)     Glaucoma (increased eye pressure)     Hemiparesis, left (H)     Mental retardation     Scoliosis     Seizure disorder (H)       Past Surgical History:   Procedure Laterality Date    CATARACT IOL, RT/LT Right     EXAM UNDER ANESTHESIA EYE(S) Bilateral 8/15/2017    Procedure: EXAM UNDER ANESTHESIA EYE(S);;  Surgeon: Laney Bardales MD;  Location: [facility]    GASTROSTOMY TUBE      Glands or ducts surgery sever due to choking saliva      ORTHOPEDIC SURGERY      hamstring lengthening    VITRECTOMY PARSPLANA WITH 23 GAUGE SYSTEM Right 8/15/2017    Procedure: VITRECTOMY PARSPLANA WITH 23 GAUGE SYSTEM;  RIGHT EYE VITRECTOMY PARSPLANA WITH 23 GAUGE SYSTEM, EXAM UNDER ANESTHESIA, AIR/FLUID EXCHANGE, REMOVAL OF INTRAOCULAR LENS AND CAPSULAR TENSION RING    LEFT EYE EXAM UNDER ANESTHESIA;  Surgeon: Laney Bardales MD;  Location: [facility]      Allergies   Allergen Reactions    Autyh-Gzcri-Nlmpzdw-Pramoxine Diarrhea    Lamotrigine Rash      Social History     Tobacco Use    Smoking status: Passive Smoke Exposure - Never Smoker    Smokeless tobacco: Never    Tobacco comments:     in house   Substance Use Topics    Alcohol use: No      Wt Readings from Last 1 Encounters:   23 58.1 kg (128 lb 1.4 oz)        Anesthesia Evaluation   Pt has had prior anesthetic. Type: General (Supraglottic Airway Properties Mask Ventilation: oral airway Type: supreme Tube Size: 5 Insertion Attempts: 1 Placement Verification: CO2 detection Assessment Assessment: atraumatic Airway Intervention: cuff inflated).        ROS/MED HX  ENT/Pulmonary:       Neurologic: Comment: Left hemiparesis; mental retardation, cerebral palsy  scoliosis    (+)    "    seizures,                         Cardiovascular:       METS/Exercise Tolerance:     Hematologic:       Musculoskeletal:       GI/Hepatic:       Renal/Genitourinary:       Endo:       Psychiatric/Substance Use:       Infectious Disease:       Malignancy:       Other:            Physical Exam    Airway        Mallampati: II   TM distance: > 3 FB   Neck ROM: full   Mouth opening: > 3 cm    Respiratory Devices and Support         Dental       (+) Modest Abnormalities - crowns, retainers, 1 or 2 missing teeth      Cardiovascular   cardiovascular exam normal          Pulmonary           (+) rhonchi           OUTSIDE LABS:  CBC: No results found for: \"WBC\", \"HGB\", \"HCT\", \"PLT\"  BMP: No results found for: \"NA\", \"POTASSIUM\", \"CHLORIDE\", \"CO2\", \"BUN\", \"CR\", \"GLC\"  COAGS: No results found for: \"PTT\", \"INR\", \"FIBR\"  POC: No results found for: \"BGM\", \"HCG\", \"HCGS\"  HEPATIC: No results found for: \"ALBUMIN\", \"PROTTOTAL\", \"ALT\", \"AST\", \"GGT\", \"ALKPHOS\", \"BILITOTAL\", \"BILIDIRECT\", \"MICKY\"  OTHER: No results found for: \"PH\", \"LACT\", \"A1C\", \"CECILLE\", \"PHOS\", \"MAG\", \"LIPASE\", \"AMYLASE\", \"TSH\", \"T4\", \"T3\", \"CRP\", \"SED\"    Anesthesia Plan    ASA Status:  3    NPO Status:  NPO Appropriate    Anesthesia Type: General.     - Airway: LMA   Induction: Intravenous.   Maintenance: Balanced.        Consents    Anesthesia Plan(s) and associated risks, benefits, and realistic alternatives discussed. Questions answered and patient/representative(s) expressed understanding.     - Discussed: Risks, Benefits and Alternatives for BOTH SEDATION and the PROCEDURE were discussed     - Discussed with:  Patient      - Extended Intubation/Ventilatory Support Discussed: No.      - Patient is DNR/DNI Status: No     Use of blood products discussed: No .     Postoperative Care    Pain management: IV analgesics.   PONV prophylaxis: Ondansetron (or other 5HT-3)     Comments:                Jovany Rodarte MD  "

## 2023-11-22 NOTE — DISCHARGE INSTRUCTIONS
Post-operative Instructions - Enucleation and Evisceration   Ophthalmic Plastic and Reconstructive Surgery    Demetrius Rossi M.D.      All instructions apply to the operated eye(s) or eyelid(s).    Wound care and personal care  ? Leave the dressing in place until seen in clinic. Ensure that the bandage does not get wet when you take a shower.  ? Warm soaks or warm packs should be used over the operated side four times daily after the dressing has been removed.    ? You may shower the day after surgery but do not get the dressing wet. Do not bathe for 1 week to prevent contamination of your wounds. Do not submerge your head in water (swimming, in a tub) for six weeks.  ? Expect some swelling, bruising, black eye (even into the lower eyelids and cheeks). Also expect serum caking, crusting and discharge from the eye and/or incisions. A small amount of surface bleeding is normal for the first 48 hours.  ? Your eye(s) and eyelid(s) may be painful and tender. This is normal after surgery. Use the pain medication as prescribed.    Follow-up  ? Return to the Eye Clinic for a follow-up appointment with your physician as scheduled. If no appointment has been scheduled, you should be seen within 4-7 days to have the dressing removed.    Activity restrictions and driving  ? Avoid heavy lifting (over 15 pounds), bending, exercise or strenuous activity for 1 week after surgery.  You may resume other activities as tolerated.  ? Sinus Precautions for 1 week: Sneeze with mouth open. Cough with mouth open. No blowing nose. No straws. No bending over.    Medications  ? Restart all your regular home medications. If you were using eye drops in the operated eye, you can stop those. If you take Plavix or Aspirin on a regular basis, wait for 72 hours after your surgery before restarting these in order to decrease the risk of bleeding complications.  ? Avoid aspirin and aspirin-like medications (Motrin, Aleve, Ibuprofen, [place] etc)  for 72 hours to reduce the risk of bleeding. You may take Tylenol (acetaminophen) for pain.  ? In addition to your home medications, take the following post-operative medications as prescribed by your physician:  ? Take antibiotic capsules or tablets as directed.  ? Take scheduled extra strength Tylenol for pain.  You may take the pain pills (oxycodone) at prescribed frequency as needed for breakthrough pain.  ? Once your dressing has been removed, you should apply the prescribed antibiotic ointment to the operated eye socket three times a day.    ? WARNING: Many prescription pain medications contain Tylenol  (acetaminophen). You must not take more than 4,000 mg of acetaminophen per  24-hour period. This is equivalent to 6 tablets of Darvocet, 8 tablets of Vicodin, or  12 tablets of Norco, Percocet or Tylenol #3. If you take other over-the-counter medications containing acetaminophen, you must take the amount of acetaminophen into account and reduce the number of prescribed pain pills accordingly.  ? The prescribed medications may make you drowsy. You must not drive a car,  operate heavy machinery or drink alcohol while taking them.  ? The prescribed pain medications may cause constipation and nausea. Take them with some food to prevent a stomach upset. If you continue to experience nausea, call your physician.    Contact Information:  If you have any problems, concerns, or need to schedule follow up please call the clinic:    If your clinic appointments (regardless of where surgery was performed) are at the [facility]: [phone]     An on call person can be reached after hours for concerns. The on call doctor should not call in medication refill requests after hours or on weekends, so please plan accordingly. An effort has been made to provide adequate pain medications following every surgery, and refills will not be provided in most instances. Narcotic pain medications cannot be called in.      Same-Day Surgery   Adult Discharge Orders & Instructions     For 24 hours after surgery:  Get plenty of rest.  A responsible adult must stay with you for at least 24 hours after you leave the hospital.   Pain medication can slow your reflexes. Do not drive or use heavy equipment.  If you have weakness or tingling, don't drive or use heavy equipment until this feeling goes away.  Mixing alcohol and pain medication can cause dizziness and slow your breathing. It can even be fatal. Do not drink alcohol while taking pain medication.  Avoid strenuous or risky activities.  Ask for help when climbing stairs.   You may feel lightheaded.  If so, sit for a few minutes before standing.  Have someone help you get up.   If you have nausea (feel sick to your stomach), drink only clear liquids such as apple juice, ginger ale, broth or 7-Up.  Rest may also help.  Be sure to drink enough fluids.  Move to a regular diet as you feel able. Take pain medications with a small amount of solid food, such as toast or crackers, to avoid nausea.   A slight fever is normal. Call the doctor if your fever is over 100 F (37.7 C) (taken under the tongue) or lasts longer than 24 hours.  You may have a dry mouth, muscle aches, trouble sleeping or a sore throat.  These symptoms should go away after 24 hours.  Do not make important or legal decisions.   Pain Management:      1. Take pain medication (if prescribed) for pain as directed by your physician.        2. WARNING: If the pain medication you have been prescribed contains Tylenol  (acetaminophen), DO NOT take additional doses of Tylenol (acetaminophen).     Call your doctor for any of the followin.  Signs of infection (fever, growing tenderness at the surgery site, severe pain, a large amount of drainage or bleeding, foul-smelling drainage, redness, swelling).    2.  It has been over 8 to 10 hours since surgery and you are still not able to urinate (pee).    3.  Headache for over 24  hours.    4.  Numbness, tingling or weakness the day after surgery (if you had spinal anesthesia).  To contact a doctor, call Dr Rossi's office at [phone]--Opthamology Clinic or [phone] [facility] or:  '   [phone] and ask for the Resident On Call for: Ophthalmologist on call  (answered 24 hours a day)  '   Emergency Department:  [place] [facility]: [phone]  [place] [facility]: [phone]               Rev. 10/2014

## 2023-11-22 NOTE — BRIEF OP NOTE
[facility]    Brief Operative Note    Pre-operative diagnosis: Blind painful right eye [H54.40, H57.11]  Post-operative diagnosis Same as pre-operative diagnosis    Procedure: RIGHT EVISCERATION, OCULAR CONTENTS WITH IMPLANT, Right - Eye    Surgeon: Surgeon(s) and Role:     * Demetrius Rossi MD - Primary     * Florence Gillis MD - Resident - Assisting     * Uma Mendenhall MD - Fellow - Assisting  Anesthesia: General   Estimated Blood Loss: Minimal    Drains: None  Specimens:   ID Type Source Tests Collected by Time Destination   1 : Intraocular Contents of Right Eye Tissue Eye, Right SURGICAL PATHOLOGY EXAM Demetrius Rossi MD 11/22/2023  2:10 PM      Findings:   None.  Complications: None.  Implants:   Implant Name Type Inv. Item Serial No.  Lot No. LRB No. Used Action   EYE IMP SPHERE SILICONE 18MM [id number] - [id number] Lens/Eye Implant EYE IMP SPHERE SILICONE 18MM [id number]  long-term [employer] [id number] Right 1 Implanted

## 2023-11-22 NOTE — ANESTHESIA POSTPROCEDURE EVALUATION
Patient: Jerry Ingram    Procedure: Procedure(s):  RIGHT EVISCERATION, OCULAR CONTENTS WITH IMPLANT       Anesthesia Type:  General    Note:  Disposition: Outpatient   Postop Pain Control: Uneventful            Sign Out: Well controlled pain   PONV: No   Neuro/Psych: Uneventful            Sign Out: Acceptable/Baseline neuro status   Airway/Respiratory: Uneventful            Sign Out: Acceptable/Baseline resp. status   CV/Hemodynamics: Uneventful            Sign Out: Acceptable CV status; No obvious hypovolemia; No obvious fluid overload   Other NRE: NONE   DID A NON-ROUTINE EVENT OCCUR? No           Last vitals:  Vitals Value Taken Time   BP     Temp     Pulse     Resp     SpO2 100 % 11/22/23 1459   Vitals shown include unfiled device data.    Electronically Signed By: Jovany Rodarte MD  November 22, 2023  2:59 PM

## 2023-11-22 NOTE — ANESTHESIA CARE TRANSFER NOTE
Patient: Jerry Ingram    Procedure: Procedure(s):  RIGHT EVISCERATION, OCULAR CONTENTS WITH IMPLANT       Diagnosis: Blind painful right eye [H54.40, H57.11]  Diagnosis Additional Information: No value filed.    Anesthesia Type:   General     Note:    Oropharynx: oropharynx clear of all foreign objects and spontaneously breathing  Level of Consciousness: drowsy  Oxygen Supplementation: face mask  Level of Supplemental Oxygen (L/min / FiO2): 5  Independent Airway: airway patency satisfactory and stable  Dentition: dentition unchanged  Vital Signs Stable: post-procedure vital signs reviewed and stable  Report to RN Given: handoff report given  Patient transferred to: PACU    Handoff Report: Identifed the Patient, Identified the Reponsible Provider, Reviewed the pertinent medical history, Discussed the surgical course, Reviewed Intra-OP anesthesia mangement and issues during anesthesia, Set expectations for post-procedure period and Allowed opportunity for questions and acknowledgement of understanding      Vitals:  Vitals Value Taken Time   /81 11/22/23 1459   Temp 36.6    Pulse 94 11/22/23 1509   Resp 22 11/22/23 1509   SpO2 100 % 11/22/23 1509   Vitals shown include unfiled device data.    Electronically Signed By: MARINA Morales CRNA  November 22, 2023  3:09 PM

## 2023-11-22 NOTE — ANESTHESIA PROCEDURE NOTES
Airway       Patient location during procedure: OR  Staff -        Anesthesiologist:  Jovany Rodarte MD       CRNA: Kenna Aquino APRN CRNA       Performed By: CRNA  Consent for Airway        Urgency: elective  Indications and Patient Condition       Indications for airway management: ngoc-procedural       Induction type:intravenous       Mask difficulty assessment: 0 - not attempted    Final Airway Details       Final airway type: supraglottic airway    Supraglottic Airway Details        Type: LMA       Brand: Air-Q       LMA size: 3.5    Post intubation assessment        Placement verified by: capnometry, equal breath sounds and chest rise        Number of attempts at approach: 1       Number of other approaches attempted: 0       Secured with: tape       Ease of procedure: easy       Dentition: Intact and Unchanged

## 2023-11-27 NOTE — OP NOTE
PREOPERATIVE DIAGNOSIS:  Right  blind painful eye secondary to retinal detachment    POSTOPERATIVE DIAGNOSIS: Right   blind painful eye secondary to retinal detachment    PROCEDURES PERFORMED:   1. Evisceration of right  with placement of a 18  mm  silicone implant.   2. Temporary tarsorrhaphy, right.   SURGEON: Demetrius Rossi MD.   ASSISTANTS: Uma Mendenhall MD, JENNY and Florence Gillis MD   ANESTHESIA: General via endotracheal tube and a retrobulbar block consisting of a 50:50 mixture of 2% lidocaine with epinephrine and 0.5% Marcaine.   COMPLICATIONS: None.   ESTIMATED BLOOD LOSS: Less than 10 mL.   SPECIMENS: Intraocular contents and cornea from left eye sent to pathology.   IMPLANTS: 18mm silicone sphere.   HISTORY OF PRESENT ILLNESS: Jerry Ingram  presented with a  history of severe pain and blindness in the right eye. After the risks, benefits and alternatives to the proposed procedure were explained to the patient, informed consent was obtained.   DESCRIPTION OF PROCEDURE: Jerry Ingram  was brought to the operating room and placed supine on the operating table. General anesthesia was induced. The right was identified as the correct eye for surgery. Retrobulbar block was placed on the right. The area was  prepped and draped in the typical sterile ophthalmic fashion. A 360-degree conjunctival peritomy was performed. The limbal incision was made with the keratome and the cornea was excised with [place] scissors, taking a rim of the sclera. Using the evisceration spoon, the intraocular contents were removed. Hemostasis was obtained with bipolar cautery. The scleral shell was rinsed with absolute alcohol. Hemostasis was again obtained with bipolar cautery. Relaxing incisions were made at the 2 and 7 o'clock positions on the sclera. Posterior  relaxing incisions were made in thesclera using the keratome to create a larger scleral volume to allow the 18 mm implant to be inserted and the sclera easily closed  without tension over it. The intraocular sizing spheres were used to determine the correct implant size. A 18 mm implant was chosen. The sclera was then closed with interrupted 5-0 nylon sutures passed in mattress fashion. [place] was closed with interrupted 5-0 Vicryl sutures. Conjunctiva was closed with a 6-0 Vicryl suture. Erythromycin ophthalmic ointment and a conformer were placed. Temporary tarsorrhaphy of 5-0 Vicryl was placed laterally in a double-armed horizontal mattress fashion. The pressure patch was placed. The patient tolerated the procedure well, was awoken from general anesthesia and taken to recovery room in stable condition.     VIVIENNE HUTCHINSON MD

## 2023-12-11 NOTE — PROGRESS NOTES
"Chief Complaints and History of Present Illnesses   Patient presents with    Follow Up     S/p RIGHT EVISCERATION, OCULAR CONTENTS WITH IMPLANT 11/22/23     Chief Complaint(s) and History of Present Illness(es)     Follow Up    In right eye.  Associated symptoms include eye pain and discharge.    Treatments tried include ointment.  Pain was noted as 7/10. Additional   comments: S/p RIGHT EVISCERATION, OCULAR CONTENTS WITH IMPLANT 11/22/23    Comments    Patient present today with mother who is reporting for son.   States that he has been having pain.   Has been noticing some dark red drainage after bandage was removed on the   5th day after surgery. Patient mom states \" he busted some stitches when   he took the patch off of right eye.\"  Did not notice any drainage today but did have some yesterday. Tylenol was   needed yesterday as well. Using Bacitracin ointment.     Corina Sesay, COT COT 2:56 PM December 11, 2023        Assessment & Plan     Jerry Ingram is a 38 year old male with the following diagnoses:   1. Postoperative eye state         Jerry Ingram is 2 weeks status post R evisceration with implant placement (11/22/23)  Temporary tarso in place.   Conformer in place, no implant exposure.    Patient's pain and swelling are improving since surgery, per mother.    I have recommended:  * Continue aman and WC for comfort   * Return to clinic in 8 weeks for repeat evaluation and set up subsequent appointment with [facility] for prosthesis           Uma Mendenhall MD  Oculoplastic Surgery Fellow    Attending Physician Attestation:  I did not see this patient on Dec 11, 2023, but I have reviewed the relevant history, examination findings, assessment, and plan as documented by others.  I have confirmed and edited as necessary the assessment and plan and agree with this note.  - Demetrius Gomez MD 3:17 PM 12/11/2023      "

## 2023-12-11 NOTE — NURSING NOTE
Chief Complaints and History of Present Illnesses   Patient presents with    Follow Up     S/p RIGHT EVISCERATION, OCULAR CONTENTS WITH IMPLANT 11/22/23     Chief Complaint(s) and History of Present Illness(es)       Follow Up              Laterality: right eye    Associated symptoms: eye pain and discharge    Treatments tried: ointment    Pain scale: 7/10    Comments: S/p RIGHT EVISCERATION, OCULAR CONTENTS WITH IMPLANT 11/22/23              Comments    Patient present today with mother who is reporting for son.   States that he has been having pain.   Has been noticing some dark red drainage after bandage was removed on the 5th day after surgery.   Did not notice any drainage today but did have some yesterday. Tylenol was needed yesterday as well.     Corina Sesay, COT COT 2:56 PM December 11, 2023

## 2023-12-11 NOTE — NURSING NOTE
"Chief Complaints and History of Present Illnesses   Patient presents with    Follow Up     S/p RIGHT EVISCERATION, OCULAR CONTENTS WITH IMPLANT 11/22/23     Chief Complaint(s) and History of Present Illness(es)       Follow Up              Laterality: right eye    Associated symptoms: eye pain and discharge    Treatments tried: ointment    Pain scale: 7/10    Comments: S/p RIGHT EVISCERATION, OCULAR CONTENTS WITH IMPLANT 11/22/23              Comments    Patient present today with mother who is reporting for son.   States that he has been having pain.   Has been noticing some dark red drainage after bandage was removed on the 5th day after surgery. Patient mom states \" he busted some stitches when he took the patch off of right eye.\"  Did not notice any drainage today but did have some yesterday. Tylenol was needed yesterday as well. Using Bacitracin ointment.     HUBERT Rudolph COT 2:56 PM December 11, 2023                        "

## 2023-12-11 NOTE — PATIENT INSTRUCTIONS
Contact [facility]: [phone]  ([email or web address]):     [facility] Locations:    [facility] ([place], [place])  [facility] Office Building  [address]  [facility], [address]  Office: [phone]  Toll Free: [phone]      [facility] [address]  [facility], [address]  Office: [phone]  Toll Free: [phone]      [facility] [place], [place]  [address], [address]  [place] [address]  Toll Free: [phone]

## 2023-12-14 NOTE — TELEPHONE ENCOUNTER
BACITRACIN/POLYMYX OPHTHOINT 3.5GM   Last Written Prescription Date:  10/25/2023  Last Fill Quantity: 3.5,   # refills: 1  Last Office Visit : 12/11/2023  Future Office visit:  2/5/2024    Routing refill request to provider for review/approval because:  Okay to fill med, Nothing mentioned to continue this eye med for Pt care.  Please review and send per Providers recommendations for Pt care.     Mana Olivia RN  Central Triage Red Flags/Med Refills

## 2024-01-01 ENCOUNTER — TELEPHONE (OUTPATIENT)
Dept: OPHTHALMOLOGY | Facility: CLINIC | Age: 39
End: 2024-01-01

## 2024-01-01 ENCOUNTER — TELEPHONE (OUTPATIENT)
Dept: OPHTHALMOLOGY | Facility: CLINIC | Age: 39
End: 2024-01-01
Payer: MEDICAID

## 2024-01-01 ENCOUNTER — OFFICE VISIT (OUTPATIENT)
Dept: OPHTHALMOLOGY | Facility: CLINIC | Age: 39
End: 2024-01-01
Payer: MEDICAID

## 2024-01-01 ENCOUNTER — NURSE TRIAGE (OUTPATIENT)
Dept: NURSING | Facility: CLINIC | Age: 39
End: 2024-01-01

## 2024-01-01 DIAGNOSIS — H57.10 BLIND PAINFUL EYE: ICD-10-CM

## 2024-01-01 DIAGNOSIS — T85.320A: Primary | ICD-10-CM

## 2024-01-01 DIAGNOSIS — H54.40 BLIND PAINFUL EYE: ICD-10-CM

## 2024-01-01 PROCEDURE — 99024 POSTOP FOLLOW-UP VISIT: CPT | Mod: GC | Performed by: OPHTHALMOLOGY

## 2024-01-01 RX ORDER — ERYTHROMYCIN 5 MG/G
0.5 OINTMENT OPHTHALMIC 3 TIMES DAILY
Qty: 7 G | Refills: 3 | Status: SHIPPED | OUTPATIENT
Start: 2024-01-01 | End: 2024-01-01

## 2024-01-01 RX ORDER — ERYTHROMYCIN 5 MG/G
0.5 OINTMENT OPHTHALMIC 3 TIMES DAILY
Qty: 7 G | Refills: 3 | Status: SHIPPED | OUTPATIENT
Start: 2024-01-01

## 2024-01-01 RX ORDER — BACITRACIN ZINC AND POLYMYXIN B SULFATE 500; 10000 [USP'U]/G; [USP'U]/G
OINTMENT OPHTHALMIC
Qty: 3.5 G | Refills: 1 | Status: SHIPPED | OUTPATIENT
Start: 2024-01-01

## 2024-01-01 ASSESSMENT — SLIT LAMP EXAM - LIDS
COMMENTS: NORMAL
COMMENTS: NORMAL

## 2024-01-01 ASSESSMENT — EXTERNAL EXAM - RIGHT EYE: OD_EXAM: NORMAL

## 2024-01-01 ASSESSMENT — EXTERNAL EXAM - LEFT EYE: OS_EXAM: NORMAL

## 2024-01-03 NOTE — TELEPHONE ENCOUNTER
1/2/24 mom called stating that scleral conformer fell out. Coached her on how to replace it and she was able to put the conformer back in and replace the shield. We discussed that she should call in again if it falls out and she is not able to get it back in.

## 2024-01-08 NOTE — TELEPHONE ENCOUNTER
"Can you call patient's geritea Nicole Yuan (GUARDIAN) [phone] And offer appointment w/ Dr. Galdamez tomorrow, any post op or return spot is fine. \"Shanelleer fell out, Flo patient\"  Thanks,  Afsaneh Hodgson RN RN 2:25 PM 01/08/24    "

## 2024-01-08 NOTE — TELEPHONE ENCOUNTER
Nurse Triage SBAR    Is this a 2nd Level Triage?  Yes    Situation:    Right eye shield fell out    Background/Assessment:     On 11/22/2023  RIGHT EVISCERATION, OCULAR CONTENTS WITH IMPLANT     Mother reporting the shield came out of the Pt's eye.       They have tried to put it back in.  But can see it moving around in the eye.    The right eye is very red, irritated, and swollen.    Please call the mother back @ [phone] for further assistance.    Mana Olivia RN  Central Triage Red Flags/Med Refills      Protocol Recommended Disposition:   See Today In Office      Reason for Disposition   Patient wants to be seen    Additional Information   Negative: Followed an eye injury   Negative: Eye pain from chemical in the eye   Negative: Eye pain from foreign body in eye   Negative: Has sinus pain or pressure   Negative: Severe eye pain   Negative: Complete loss of vision in one or both eyes   Negative: Eyelids are very swollen (shut or almost) and fever   Negative: Eyelid (outer) is very red and fever   Negative: Foreign body sensation ('feels like something is in there') and irrigation didn't help   Negative: Vomiting   Negative: Ulcer or sore seen on the cornea (clear center part of the eye)   Negative: Recent eye surgery and increasing eye pain   Negative: Blurred vision and new or worsening   Negative: Patient sounds very sick or weak to the triager   Negative: Eye pain/discomfort and more than mild   Negative: Eyelids are very swollen (shut or almost) and no fever   Negative: Painful rash near eye and multiple small blisters grouped together   Negative: Pain followed bright light exposure from welding   Negative: Looking at light causes severe pain (i.e., photophobia)   Negative: Yellow or green pus occurs   Negative: Eye pain present > 24 hours    Protocols used: Eye Pain-A-[place]

## 2024-01-09 NOTE — PROGRESS NOTES
Chief Complaint(s) and History of Present Illness(es)     Post Op (Ophthalmology) Right Eye            Comments: S/p RIGHT EVISCERATION, OCULAR CONTENTS WITH IMPLANT 11/22/23          Comments    Conformer fell out.   No workup per [place]      Here because his conformer fell out and his right eye has been red since. When the prosthetic fell out it was washed with warm water and bacitracin was placed in the eye after it was replaced.     Assessment & Plan     Jerry Ingram is a 38 year old male with the following diagnoses:   Encounter Diagnosis   Name Primary?    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 Conformer easily replaced.   Continue with plan for prosthetic placement with [facility]    Patient disposition:   Return for As scheduled .     William Cristobal MD  Resident Physician, PGY-2  Department of Ophthalmology       Attending Physician Attestation: Complete documentation of historical and exam elements from today's encounter can be found in the full encounter summary report (not reduplicated in this progress note). I personally obtained the chief complaint(s) and history of present illness. I confirmed and edited as necessary the review of systems, past medical/surgical history, family history, social history, and examination findings as documented by others; and I examined the patient myself. I personally reviewed the relevant tests, images, and reports as documented above. I formulated and edited as necessary the assessment and plan and discussed the findings and management plan with the patient.  -Rohit Galdamez MD

## 2024-01-16 NOTE — TELEPHONE ENCOUNTER
[facility] Call Center    Phone Message    May a detailed message be left on voicemail: yes     Reason for Call: Medication Refill Request    Has the patient contacted the pharmacy for the refill? Yes   Name of medication being requested: bacitracin-polymyxin b (POLYSPORIN) [id number] UNIT/GM ophthalmic ointment  Provider who prescribed the medication: Dr. Galdamez  Pharmacy: [id number] - [place] [address] AT [facility] OF  [address]  Date medication is needed: ASAP    Due to the pt being on Medicaid, this Rx cannot be sent by a resident or fellow.    Action Taken: Message routed to:  Other: Med Refills Team    Travel Screening: Not Applicable

## 2024-01-16 NOTE — TELEPHONE ENCOUNTER
"bacitracin-polymyxin b (POLYSPORIN) [id number] UNIT/GM ophthalmic ointment   3.5 g 1 12/14/2023 1/9/2024  [facility] - Rohit Costa MD  Ophthalmology    \" bacitracin was placed in the eye after it was replaced. \"           "

## 2024-02-12 ENCOUNTER — TELEPHONE (OUTPATIENT)
Dept: OPHTHALMOLOGY | Facility: CLINIC | Age: 39
End: 2024-02-12

## 2024-02-12 NOTE — TELEPHONE ENCOUNTER
Writer received a call from Nicole, patient mother. Nicole asked that we cancel all up coming appointments, Jerry has passed away. Writer can condolences and let Nicole know that they would take care of notifying team. Randeer sent Notice of  patient form to [facility] email. Princess Mcnamara on 2024 at 11:19 AM

## (undated) DEVICE — ESU PENCIL W/HOLSTER E2350H

## (undated) DEVICE — POSITIONER ARMBOARD FOAM 1PAIR LF FP-ARMB1

## (undated) DEVICE — ESU ELEC NDL 1" COATED/INSULATED E1465

## (undated) DEVICE — EYE PERFLUORON KIT 5ML 8065900163

## (undated) DEVICE — PACK MINOR EYE

## (undated) DEVICE — TAPE ELASTIKON 2" LATEX

## (undated) DEVICE — TAPE MICROPORE 2"X1.5YD 1530S-2

## (undated) DEVICE — TUBING SUCTION MEDI-VAC SOFT 3/16"X20' N520A

## (undated) DEVICE — EYE NDL RETROBULBAR 25GA 1.5" 581275

## (undated) DEVICE — SOL ADH LIQUID BENZOIN SWAB 0.6ML C1544

## (undated) DEVICE — DRAPE MICROSCOPE DRAPE  EYE DI40001

## (undated) DEVICE — SYR 03ML LL W/O NDL 309657

## (undated) DEVICE — GLOVE PROTEXIS MICRO 7.5  2D73PM75

## (undated) DEVICE — SU VICRYL 6-0 LP-1 18" D8097

## (undated) DEVICE — PACK VITRECTOMY PQ15VI57E

## (undated) DEVICE — LINEN TOWEL PACK X5 5464

## (undated) DEVICE — GLOVE PROTEXIS MICRO 8.0  2D73PM80

## (undated) DEVICE — SYR 05ML SLIP TIP W/O NDL 309647

## (undated) DEVICE — PEN MARKING SKIN VISIMARK 1424SR

## (undated) DEVICE — Device

## (undated) DEVICE — COVER CAMERA IN-LIGHT DISP LT-C02

## (undated) DEVICE — DRSG GAUZE 4X4" 8044

## (undated) DEVICE — SYR 05ML SLIP TIP W/O NDL

## (undated) DEVICE — EYE SOL BSS 500ML

## (undated) DEVICE — EYE PACK 23GA CONSTELLATION PPK4254-03

## (undated) DEVICE — SU ETHILON 10-0 CS160-6 12" 9000G

## (undated) DEVICE — SU ETHILON 5-0 RD-1DA 18" 749G

## (undated) DEVICE — EYE FORCEPS TIP ILM DISP 23GA 723.44

## (undated) DEVICE — GLOVE PROTEXIS MICRO 7.0  2D73PM70

## (undated) DEVICE — NDL 30GA 0.5" 305106

## (undated) DEVICE — TAPE MICROPORE 1"X1.5YD 1530S-1

## (undated) DEVICE — GOWN LG DISP 9515

## (undated) DEVICE — EYE KNIFE SLIT XSTAR VISITEC 3.0MM 45DEG 373730

## (undated) DEVICE — EYE CONFORMER MED ETO-CA-02

## (undated) DEVICE — GLOVE PROTEXIS MICRO 6.0  2D73PM60

## (undated) DEVICE — APPLICATOR COTTON TIP 3" X50

## (undated) DEVICE — DRSG EYE PAD STERILE 1.63X2.63" NON21600

## (undated) DEVICE — SU VICRYL 5-0 P-2 18" UND J503G

## (undated) DEVICE — SU PLAIN 6-0 TG140-8 18" 1735G

## (undated) DEVICE — EYE PREP BETADINE 5% SOLUTION 30ML 0065-0411-30

## (undated) DEVICE — ESU CORD BIPOLAR GREEN 10-4000

## (undated) DEVICE — GLOVE BIOGEL PI MICRO SZ 7.5 48575

## (undated) DEVICE — NDL 18GA 1.5" 305196

## (undated) DEVICE — EYE KNIFE SLIT XSTAR VISITEC 2.8MM 45DEG 373728

## (undated) DEVICE — EYE SHIELD PLASTIC

## (undated) DEVICE — ESU HOLSTER PLASTIC DISP E2400

## (undated) RX ORDER — WATER 10 ML/10ML
INJECTION INTRAMUSCULAR; INTRAVENOUS; SUBCUTANEOUS
Status: DISPENSED
Start: 2017-08-15

## (undated) RX ORDER — DEXAMETHASONE SODIUM PHOSPHATE 4 MG/ML
INJECTION, SOLUTION INTRA-ARTICULAR; INTRALESIONAL; INTRAMUSCULAR; INTRAVENOUS; SOFT TISSUE
Status: DISPENSED
Start: 2017-08-15

## (undated) RX ORDER — FENTANYL CITRATE 50 UG/ML
INJECTION, SOLUTION INTRAMUSCULAR; INTRAVENOUS
Status: DISPENSED
Start: 2023-01-01

## (undated) RX ORDER — PROPOFOL 10 MG/ML
INJECTION, EMULSION INTRAVENOUS
Status: DISPENSED
Start: 2023-01-01

## (undated) RX ORDER — ONDANSETRON 2 MG/ML
INJECTION INTRAMUSCULAR; INTRAVENOUS
Status: DISPENSED
Start: 2017-08-15

## (undated) RX ORDER — NEOMYCIN SULFATE, POLYMYXIN B SULFATE, AND DEXAMETHASONE 3.5; 10000; 1 MG/G; [USP'U]/G; MG/G
OINTMENT OPHTHALMIC
Status: DISPENSED
Start: 2017-08-15

## (undated) RX ORDER — PHENYLEPHRINE HYDROCHLORIDE 100 MG/ML
SOLUTION/ DROPS OPHTHALMIC
Status: DISPENSED
Start: 2017-08-15

## (undated) RX ORDER — PROPOFOL 10 MG/ML
INJECTION, EMULSION INTRAVENOUS
Status: DISPENSED
Start: 2017-08-15

## (undated) RX ORDER — TRIAMCINOLONE ACETONIDE 40 MG/ML
INJECTION, SUSPENSION INTRA-ARTICULAR; INTRAMUSCULAR
Status: DISPENSED
Start: 2017-08-15

## (undated) RX ORDER — CEFAZOLIN SODIUM/WATER 2 G/20 ML
SYRINGE (ML) INTRAVENOUS
Status: DISPENSED
Start: 2023-01-01

## (undated) RX ORDER — LIDOCAINE HYDROCHLORIDE 20 MG/ML
INJECTION, SOLUTION EPIDURAL; INFILTRATION; INTRACAUDAL; PERINEURAL
Status: DISPENSED
Start: 2017-08-15

## (undated) RX ORDER — FENTANYL CITRATE 50 UG/ML
INJECTION, SOLUTION INTRAMUSCULAR; INTRAVENOUS
Status: DISPENSED
Start: 2017-08-15

## (undated) RX ORDER — CEFAZOLIN SODIUM 500 MG/2.2ML
INJECTION, POWDER, FOR SOLUTION INTRAMUSCULAR; INTRAVENOUS
Status: DISPENSED
Start: 2017-08-15

## (undated) RX ORDER — ATROPINE SULFATE 10 MG/ML
SOLUTION/ DROPS OPHTHALMIC
Status: DISPENSED
Start: 2017-08-15